# Patient Record
Sex: MALE | Race: ASIAN | Employment: FULL TIME | URBAN - METROPOLITAN AREA
[De-identification: names, ages, dates, MRNs, and addresses within clinical notes are randomized per-mention and may not be internally consistent; named-entity substitution may affect disease eponyms.]

---

## 2017-04-03 ENCOUNTER — HOSPITAL ENCOUNTER (OUTPATIENT)
Dept: SURGERY | Age: 62
Discharge: HOME | End: 2017-04-03
Payer: COMMERCIAL

## 2017-04-03 ENCOUNTER — HOSPITAL ENCOUNTER (OUTPATIENT)
Dept: PHYSICAL THERAPY | Age: 62
Discharge: HOME | End: 2017-04-03
Payer: COMMERCIAL

## 2017-04-03 VITALS
SYSTOLIC BLOOD PRESSURE: 140 MMHG | TEMPERATURE: 96.6 F | BODY MASS INDEX: 30.7 KG/M2 | OXYGEN SATURATION: 98 % | RESPIRATION RATE: 16 BRPM | HEIGHT: 69 IN | DIASTOLIC BLOOD PRESSURE: 66 MMHG | WEIGHT: 207.25 LBS | HEART RATE: 72 BPM

## 2017-04-03 LAB
ANION GAP BLD CALC-SCNC: 9 MMOL/L (ref 7–16)
APPEARANCE UR: CLEAR
APTT PPP: 27.7 SEC (ref 25.3–32.9)
ATRIAL RATE: 75 BPM
BACTERIA SPEC CULT: ABNORMAL
BASOPHILS # BLD AUTO: 0 K/UL (ref 0–0.2)
BASOPHILS # BLD: 0 % (ref 0–2)
BILIRUB UR QL: NEGATIVE
BUN SERPL-MCNC: 10 MG/DL (ref 8–23)
CALCIUM SERPL-MCNC: 8.8 MG/DL (ref 8.3–10.4)
CALCULATED P AXIS, ECG09: 38 DEGREES
CALCULATED R AXIS, ECG10: 41 DEGREES
CALCULATED T AXIS, ECG11: 46 DEGREES
CHLORIDE SERPL-SCNC: 105 MMOL/L (ref 98–107)
CO2 SERPL-SCNC: 29 MMOL/L (ref 21–32)
COLOR UR: YELLOW
CREAT SERPL-MCNC: 1.12 MG/DL (ref 0.8–1.5)
DIAGNOSIS, 93000: NORMAL
DIFFERENTIAL METHOD BLD: ABNORMAL
EOSINOPHIL # BLD: 0.4 K/UL (ref 0–0.8)
EOSINOPHIL NFR BLD: 6 % (ref 0.5–7.8)
ERYTHROCYTE [DISTWIDTH] IN BLOOD BY AUTOMATED COUNT: 13.2 % (ref 11.9–14.6)
GLUCOSE SERPL-MCNC: 88 MG/DL (ref 65–100)
GLUCOSE UR STRIP.AUTO-MCNC: NEGATIVE MG/DL
HCT VFR BLD AUTO: 41.4 % (ref 41.1–50.3)
HGB BLD-MCNC: 13.6 G/DL (ref 13.6–17.2)
HGB UR QL STRIP: NEGATIVE
IMM GRANULOCYTES # BLD: 0 K/UL (ref 0–0.5)
IMM GRANULOCYTES NFR BLD AUTO: 0.2 % (ref 0–5)
INR PPP: 1.1 (ref 0.9–1.2)
KETONES UR QL STRIP.AUTO: NEGATIVE MG/DL
LEUKOCYTE ESTERASE UR QL STRIP.AUTO: NEGATIVE
LYMPHOCYTES # BLD AUTO: 26 % (ref 13–44)
LYMPHOCYTES # BLD: 1.6 K/UL (ref 0.5–4.6)
MCH RBC QN AUTO: 30.6 PG (ref 26.1–32.9)
MCHC RBC AUTO-ENTMCNC: 32.9 G/DL (ref 31.4–35)
MCV RBC AUTO: 93 FL (ref 79.6–97.8)
MONOCYTES # BLD: 0.7 K/UL (ref 0.1–1.3)
MONOCYTES NFR BLD AUTO: 12 % (ref 4–12)
NEUTS SEG # BLD: 3.5 K/UL (ref 1.7–8.2)
NEUTS SEG NFR BLD AUTO: 56 % (ref 43–78)
NITRITE UR QL STRIP.AUTO: NEGATIVE
P-R INTERVAL, ECG05: 186 MS
PH UR STRIP: 6 [PH] (ref 5–9)
PLATELET # BLD AUTO: 225 K/UL (ref 150–450)
PMV BLD AUTO: 9.5 FL (ref 10.8–14.1)
POTASSIUM SERPL-SCNC: 4.2 MMOL/L (ref 3.5–5.1)
PROT UR STRIP-MCNC: NEGATIVE MG/DL
PROTHROMBIN TIME: 11.8 SEC (ref 9.6–12)
Q-T INTERVAL, ECG07: 382 MS
QRS DURATION, ECG06: 98 MS
QTC CALCULATION (BEZET), ECG08: 426 MS
RBC # BLD AUTO: 4.45 M/UL (ref 4.23–5.67)
SERVICE CMNT-IMP: ABNORMAL
SODIUM SERPL-SCNC: 143 MMOL/L (ref 136–145)
SP GR UR REFRACTOMETRY: 1.02 (ref 1–1.02)
UROBILINOGEN UR QL STRIP.AUTO: 0.2 EU/DL (ref 0.2–1)
VENTRICULAR RATE, ECG03: 75 BPM
WBC # BLD AUTO: 6.3 K/UL (ref 4.3–11.1)

## 2017-04-03 PROCEDURE — 97161 PT EVAL LOW COMPLEX 20 MIN: CPT

## 2017-04-03 RX ORDER — KETOCONAZOLE 20 MG/ML
SHAMPOO TOPICAL DAILY PRN
COMMUNITY
End: 2019-02-19

## 2017-04-03 RX ORDER — LEVOTHYROXINE SODIUM 88 UG/1
88 TABLET ORAL
COMMUNITY

## 2017-04-03 RX ORDER — GLUCOSAM/CHONDR-MSM6/MANGANESE 467-438 MG
CAPSULE ORAL DAILY
COMMUNITY
End: 2017-04-26

## 2017-04-03 RX ORDER — AMLODIPINE BESYLATE 5 MG/1
5 TABLET ORAL
COMMUNITY

## 2017-04-03 RX ORDER — VALSARTAN 80 MG/1
80 TABLET ORAL DAILY
COMMUNITY
End: 2019-02-19

## 2017-04-03 RX ORDER — GLYBURIDE-METFORMIN HYDROCHLORIDE 1.25; 25 MG/1; MG/1
1 TABLET ORAL 2 TIMES DAILY
COMMUNITY

## 2017-04-03 RX ORDER — GUAIFENESIN 100 MG/5ML
81 LIQUID (ML) ORAL
COMMUNITY
End: 2017-04-26

## 2017-04-03 NOTE — PERIOP NOTES
Patient verified name, , and surgery as listed in [facility]. Type 3 surgery, joint camp assessment complete. Labs per surgeon: cbc, bmp, pt, ptt, ua ; results within anesthesia guidelines; printed/placed on chart; routed to Dr. Tk Loomis, surgeon and to Dr. Heide Meléndez, PCP for further review. Labs per anesthesia protocol: none  EKG:completed per protocol; within anesthesia guidelines~ placed on chart for reference    Hibiclens and instructions given per hospital policy. Nasal Swab collected per MD order and instructions for Mupirocin nasal ointment if required. Patient provided with handouts including Guide to Surgery, Pain Management, Hand Hygiene, Blood Transfusion Education, and [place] [facility] Brochure. Patient answered medical/surgical history questions at their best of ability. All prior to admission medications documented in [facility]. Original medication prescription bottle NOT visualized during patient appointment. Patient instructed to hold all vitamins 7 days prior to surgery and NSAIDS 5 days prior to surgery. Medications to be held prior to surgery Aspirin hold for 5 days prior to surgery. Patient instructed to continue previous medications as prescribed prior to surgery and to take the following medications the day of surgery according to anesthesia guidelines with a small sip of water: Levothyroxine. Patient taught back and verbalized understanding.

## 2017-04-03 NOTE — PROGRESS NOTES
04/03/17 0900   Oxygen Therapy   O2 Sat (%) 97 %   Pulse via Oximetry 83 beats per minute   O2 Device Room air   Pre-Treatment   Breath Sounds Bilateral Clear;Diminished   Pre FEV1 (liters) 2.3 liters   % Predicted 62  (sinus allergies)   Incentive Spirometry Treatment   Actual Volume (ml) 1500 ml     Initial respiratory Assessment completed with pt. Pt was interviewed and evaluated in Joint camp prior to surgery. Patient ID:  Leandra Contreras  [id number]  38 y.o.  1955  Surgeon: Dr. Marianela Cox  Date of Surgery: 4/25/2017  Procedure: Total Right Knee Arthroplasty  Primary Care Physician: Not On File Bshsi None  Specialists:                                  Pt instructed in the use of Incentive Spirometry. Pt instructed to bring Incentive Spirometer back on date of surgery & to start using Is upon return to pt room. Pt taught proper cough technique      History of smoking:   NONE      Quit date:    Secondhand smoke:NONE      Past procedures with Oxygen desaturation:NONE    Past Medical History:   Diagnosis Date    Diabetes ([facility] [address]     thyroid    Hypertension     Hypothyroidism     takes medication                                    ENT:SINUS                                                                                                                      Respiratory history:                                  DENIES SOB                                  Respiratory meds:                                                         FAMILY PRESENT:                   NO                                        PAST SLEEP STUDY:              NO  HX OF LINA:                            NO                                     LINA assessment:                                               SLEEPS ON SIDE                                                   PHYSICAL EXAM   Body mass index is 30.61 kg/(m^2).    Visit Vitals    /66 (BP 1 Location: Right arm, BP Patient Position: Sitting)    Pulse 72    Temp 96.6 °F (35.9 °C)  Resp 16    Ht 5' 9\" (1.753 m)    Wt 94 kg (207 lb 4 oz)    SpO2 98%    BMI 30.61 kg/m2     Neck circumference: 44.5     cm    Loud snoring:MINIMAL    Witnessed apnea or wakening gasping or choking:,DENIES,     Awakens with headaches:DENIES    Morning or daytime tiredness/ sleepiness: DENIES   YET NAPS 1 HOUR 5 DAYS A WEEK    Dry mouth or sore throat in morning: DENIES    Freidman stage:4    SACS score:25    STOP/BAN                              CPAP:NA               CONT SAT HS        Referrals:    Pt. Phone Number:

## 2017-04-03 NOTE — PERIOP NOTES
Recent Results (from the past 8 hour(s))   CBC WITH AUTOMATED DIFF    Collection Time: 04/03/17  9:26 AM   Result Value Ref Range    WBC 6.3 4.3 - 11.1 K/uL    RBC 4.45 4.23 - 5.67 M/uL    HGB 13.6 13.6 - 17.2 g/dL    HCT 41.4 41.1 - 50.3 %    MCV 93.0 79.6 - 97.8 FL    MCH 30.6 26.1 - 32.9 PG    MCHC 32.9 31.4 - 35.0 g/dL    RDW 13.2 11.9 - 14.6 %    PLATELET 752 836 - 989 K/uL    MPV 9.5 (L) 10.8 - 14.1 FL    DF AUTOMATED      NEUTROPHILS 56 43 - 78 %    LYMPHOCYTES 26 13 - 44 %    MONOCYTES 12 4.0 - 12.0 %    EOSINOPHILS 6 0.5 - 7.8 %    BASOPHILS 0 0.0 - 2.0 %    IMMATURE GRANULOCYTES 0.2 0.0 - 5.0 %    ABS. NEUTROPHILS 3.5 1.7 - 8.2 K/UL    ABS. LYMPHOCYTES 1.6 0.5 - 4.6 K/UL    ABS. MONOCYTES 0.7 0.1 - 1.3 K/UL    ABS. EOSINOPHILS 0.4 0.0 - 0.8 K/UL    ABS. BASOPHILS 0.0 0.0 - 0.2 K/UL    ABS. IMM.  GRANS. 0.0 0.0 - 0.5 K/UL   METABOLIC PANEL, BASIC    Collection Time: 04/03/17  9:26 AM   Result Value Ref Range    Sodium 143 136 - 145 mmol/L    Potassium 4.2 3.5 - 5.1 mmol/L    Chloride 105 98 - 107 mmol/L    CO2 29 21 - 32 mmol/L    Anion gap 9 7 - 16 mmol/L    Glucose 88 65 - 100 mg/dL    BUN 10 8 - 23 MG/DL    Creatinine 1.12 0.8 - 1.5 MG/DL    GFR est AA >60 >60 ml/min/1.73m2    GFR est non-AA >60 >60 ml/min/1.73m2    Calcium 8.8 8.3 - 10.4 MG/DL   PROTHROMBIN TIME + INR    Collection Time: 04/03/17  9:26 AM   Result Value Ref Range    Prothrombin time 11.8 9.6 - 12.0 sec    INR 1.1 0.9 - 1.2     PTT    Collection Time: 04/03/17  9:26 AM   Result Value Ref Range    aPTT 27.7 25.3 - 32.9 SEC   URINALYSIS W/ RFLX MICROSCOPIC    Collection Time: 04/03/17  9:26 AM   Result Value Ref Range    Color YELLOW      Appearance CLEAR      Specific gravity 1.017 1.001 - 1.023      pH (UA) 6.0 5.0 - 9.0      Protein NEGATIVE  NEG mg/dL    Glucose NEGATIVE  mg/dL    Ketone NEGATIVE  NEG mg/dL    Bilirubin NEGATIVE  NEG      Blood NEGATIVE  NEG      Urobilinogen 0.2 0.2 - 1.0 EU/dL    Nitrites NEGATIVE  NEG      Leukocyte Esterase NEGATIVE  NEG     EKG, 12 LEAD, INITIAL    Collection Time: 04/03/17 10:42 AM   Result Value Ref Range    Ventricular Rate 75 BPM    Atrial Rate 75 BPM    P-R Interval 186 ms    QRS Duration 98 ms    Q-T Interval 382 ms    QTC Calculation (Bezet) 426 ms    Calculated P Axis 38 degrees    Calculated R Axis 41 degrees    Calculated T Axis 46 degrees    Diagnosis       !!! Poor data quality, interpretation may be adversely affected  Normal sinus rhythm  Normal ECG  No previous ECGs available

## 2017-04-03 NOTE — PROGRESS NOTES
Amber Boles  : (54 y.o.) [facility] at [place] [address].  [phone]       Physical Therapy Prehab Plan of Treatment and Evaluation Summary:4/3/2017    ICD-10: Treatment Diagnosis:   · Pain in Right Knee (M25.561)  · Stiffness of Right Knee, Not elsewhere classified (M25.661)  · Difficulty in walking, Not elsewhere classified (R26.2)  Precautions/Allergies:   Review of patient's allergies indicates no known allergies. MEDICAL/REFERRING DIAGNOSIS:  Unilateral primary osteoarthritis, right knee [M17.11]  REFERRING PHYSICIAN: Ramesh Freedman MD  DATE OF SURGERY: 17   Assessment:   Comments:  Pt. Plans on going to his son's house in [facility] after surgery. PROBLEM LIST (Impacting functional limitations):  Mr. Opal Ramirez presents with the following right lower extremity(s) problems:  1. Strength  2. Range of Motion  3. Home Exercise Program  4. Pain   INTERVENTIONS PLANNED:  1. Home Exercise Program  2. Educational Discussion     TREATMENT PLAN: Effective Dates: 4/3/2017 TO 4/3/2017. Frequency/Duration: Patient to continue to perform home exercise program at least twice per day up until his surgery. GOALS: (Goals have been discussed and agreed upon with patient.)  Discharge Goals: Time Frame: 1 Day  1. Patient will demonstrate independence with a home exercise program designed to increase strength, range of motion and pain control to minimize functional deficits and optimize patient for total joint replacement. Rehabilitation Potential For Stated Goals: Good  Regarding [address] therapy, I certify that the treatment plan above will be carried out by a therapist or under their direction.   Thank you for this referral,  Shraddha Olson, PT               HISTORY:   Present Symptoms:  Pain Intensity 1:  (5 at worst)  Pain Location 1: Knee   History of Present Injury/Illness (Reason for Referral):  Medical/Referring Diagnosis: Unilateral primary osteoarthritis, right knee [M17.11]   Past Medical History/Comorbidities:   Mr. Alvarado Marcelo  has a past medical history of Diabetes ([facility] [address] Hypertension; and Hypothyroidism. He also has no past medical history of Aneurysm ([facility] [address] Arrhythmia; Arthritis; Asthma; Autoimmune disease ([facility] [address] CAD (coronary artery disease); Cancer ([facility] [address] Chronic kidney disease; Chronic obstructive pulmonary disease ([address] Chronic pain; Coagulation disorder ([facility] [address] Difficult intubation; Endocarditis; GERD (gastroesophageal reflux disease); Heart failure ([facility] [address] Liver disease; Malignant hyperthermia due to anesthesia; Morbid obesity ([facility] [address] Nausea & vomiting; Pseudocholinesterase deficiency; Psychiatric disorder; PUD (peptic ulcer disease); Rheumatic fever; Seizures ([facility] [address] Sleep apnea; Stroke [facility]); or Thromboembolus ([facility] [address] Mr. Alvarado Marcelo  has no past surgical history on file.   Social History/Living Environment:   Home Environment: Private residence  # Steps to Enter: 3  Rails to Enter: No  One/Two Story Residence: Two story, live on 1st floor  # of Interior Steps: 12  Interior Rails: Left  Lift Chair Available: No  Living Alone: No  Support Systems: Child(gloria), Spouse/Significant Other/Partner  Patient Expects to be Discharged to[de-identified] Private residence  Current DME Used/Available at Home: None  Tub or Shower Type: Shower  Work/Activity:  Employed, [employer] store  Dominant Side:  RIGHT  Current Medications:  See Pre-assessment nursing note   Number of Personal Factors/Comorbidities that affect the Plan of Care: 0: LOW COMPLEXITY   EXAMINATION:   ADLs (Current Functional Status):   Ambulation:  [x] Independent  [] Walk Indoors Only  [] Walk Outdoors  [] Use Assistive Device  [] Use Wheelchair Only Dressing:  [x] Independent  Requires Assistance from Someone for:  [] Sock/Shoes  [] Pants  [] Everything   Bathing/Showering:   [x] Independent  [] Requires Assistance from Someone  [] [id number]:  [] Routine house and yard work  [x] Light Housework Only  [] None   Observation/Orthostatic Postural Assessment:       ROM/Flexibility:   Gross Assessment: Yes  AROM: Within functional limits (left LE)                       RLE Assessment  RLE Assessment (WDL): Exceptions to WDL  RLE AROM  R Knee Flexion: 120  R Knee Extension: 5   Strength:   Gross Assessment: Yes  Strength: Within functional limits (left LE)              RLE Strength  R Knee Flexion: 4  R Knee Extension: 4   Functional Mobility:    Gross Assessment: Yes    Gait Description (WDL): Exceptions to WDL  Stand to Sit: Independent  Sit to Stand: Independent  Bed to Chair: Independent  Distance (ft): 250 Feet (ft)  Ambulation - Level of Assistance: Independent  Speed/Alexus: Delayed  Stance: Right decreased  Gait Abnormalities: Antalgic          Balance:    Sitting: Intact  Standing: Intact   Body Structures Involved:  1. Bones  2. Joints  3. Muscles  4. Ligaments Body Functions Affected:  1. Movement Related Activities and Participation Affected:  1. Mobility   Number of elements that affect the Plan of Care: 1-2: LOW COMPLEXITY   CLINICAL PRESENTATION:   Presentation: Stable and uncomplicated: LOW COMPLEXITY   CLINICAL DECISION MAKING:   Outcome Measure: Tool Used: Lower Extremity Functional Scale (LEFS)  Score:  Initial: 65/80 Most Recent: X/80 (Date: -- )   Interpretation of Score: 20 questions each scored on a 5 point scale with 0 representing \"extreme difficulty or unable to perform\" and 4 representing \"no difficulty\". The lower the score, the greater the functional disability. 80/80 represents no disability. Minimal detectable change is 9 points. Score 80 79-65 64-49 48-33 32-17 16-1 0   Modifier CH CI CJ CK CL CM CN     ?  Mobility - Walking and Moving Around:     - CURRENT STATUS: CI - 1%-19% impaired, limited or restricted    - GOAL STATUS: CI - 1%-19% impaired, limited or restricted    - D/C STATUS:  CI - 1%-19% impaired, limited or restricted  Medical Necessity:   · Mr. Viktoriya Huang is expected to optimize his lower extremity strength and ROM in preparation for joint replacement surgery. Reason for Services/Other Comments:  · Achieve baseline assesment of musculoskeletal system, functional mobility and home environment. , educate in PT HEP in preparation for surgery, educate in hospital plan of care. Use of outcome tool(s) and clinical judgement create a POC that gives a: Clear prediction of patient's progress: LOW COMPLEXITY   TREATMENT:   Treatment/Session Assessment:  Patient was instructed in PT- HEP to increase strength and ROM in LEs. Answered all questions. · Post session pain:  No complaints  · Compliance with Program/Exercises: compliant most of the time.   Total Treatment Duration:  PT Patient Time In/Time Out  Time In: 1000  Time Out: 88 Axel Shepherd PT

## 2017-04-04 NOTE — PERIOP NOTES
Prescription for Mupirocin ointment 22g tube, apply intranasally to both nostrils BID x5 days pre-operatively or for a total of 10 doses; called to [facility] at [phone]. Patient notified of positive MSSA nasal swab, verbalizes understanding of usage starting on 4/20/17.    4/3/2017  6:34 PM - Blaise, Lab In [facility]   Component Results   Component Value Flag Ref Range Units Status   Special Requests: NO SPECIAL REQUESTS     Final   Culture result:  (A)    Final   MRSA target DNA not detected, SA target DNA detected.   A MRSA negative, SA positive test result does not preclude MRSA nasal colonization.

## 2017-04-06 NOTE — ADVANCED PRACTICE NURSE
Total Joint Surgery Preoperative Chart Review      Patient ID:  Nam Juarez  [id number]  79 y.o.  1955  Surgeon: Dr. Julián Dhaliwal  Date of Surgery: 4/25/2017  Procedure: Total Right Knee Arthroplasty  Primary Care Physician:  Faviola Camargo MD  :      Subjective:   Nam Juarez is a 59 y.o.  male who presents for preoperative evaluation for Total Right Knee arthroplasty. This is a preoperative chart review note based on data collected by the nurse at the surgical Pre-Assessment visit. Past Medical History:   Diagnosis Date    Diabetes ([address]     thyroid    Hypertension     Hypothyroidism     takes medication      History reviewed. No pertinent surgical history. Family History   Problem Relation Age of Onset    Stroke Mother     Heart Disease Father       Social History   Substance Use Topics    Smoking status: Never Smoker    Smokeless tobacco: Never Used    Alcohol use No       Prior to Admission medications    Medication Sig Start Date End Date Taking? Authorizing Provider   mupirocin calcium (BACTROBAN) 2 % nasal ointment by Both Nostrils route two (2) times a day. Yes Historical Provider   amLODIPine (NORVASC) 5 mg tablet Take 5 mg by mouth nightly. Indications: hypertension   Yes Historical Provider   glyBURIDE-metFORMIN (GLUCOVANCE) 1. mg per tablet Take 1 Tab by mouth two (2) times a day. Takes in morning and at 7 pm   Indications: type 2 diabetes mellitus   Yes Historical Provider   levothyroxine (SYNTHROID) 88 mcg tablet Take  by mouth Daily (before breakfast). Take / use AM day of surgery  per anesthesia protocols. Indications: hypothyroidism   Yes Historical Provider   valsartan (DIOVAN) 80 mg tablet Take 80 mg by mouth daily. Indications: hypertension   Yes Historical Provider   VIT C/E/ZN/COPPR/LUTEIN/ZEAXAN (PRESERVISION AREDS 2 PO) Take  by mouth two (2) times a day. Yes Historical Provider   glucosam-chondr msm#6-manganes 467-438-0.7 mg cap Take  by mouth daily.    Yes Historical Provider   aspirin 81 mg chewable tablet Take 81 mg by mouth nightly. Indications: myocardial infarction prevention   Yes Historical Provider   ketoconazole (NIZORAL) 2 % shampoo Apply  to affected area daily as needed for Itching. Yes Historical Provider     No Known Allergies       Objective:     Physical Exam:   No data found. ECG:    EKG Results     Procedure 720 Value Units Date/Time    EKG, 12 LEAD, INITIAL [[id number]] Collected:  04/03/17 1042    Order Status:  Completed Updated:  04/03/17 1214     Ventricular Rate 75 BPM      Atrial Rate 75 BPM      P-R Interval 186 ms      QRS Duration 98 ms      Q-T Interval 382 ms      QTC Calculation (Bezet) 426 ms      Calculated P Axis 38 degrees      Calculated R Axis 41 degrees      Calculated T Axis 46 degrees      Diagnosis --     !!! Poor data quality, interpretation may be adversely affected  Normal sinus rhythm  Normal ECG  No previous ECGs available  Confirmed by Jeni Sommer MD (), VIVIANA DURAN ([id number]) on 4/3/2017 12:14:35 PM            Data Review:   Labs:   Results for Khushi Roblero (MRN [id number]) as of 4/6/2017 14:21   Ref. Range 4/3/2017 09:26   Sodium Latest Ref Range: 136 - 145 mmol/L 143   Potassium Latest Ref Range: 3.5 - 5.1 mmol/L 4.2   Chloride Latest Ref Range: 98 - 107 mmol/L 105   CO2 Latest Ref Range: 21 - 32 mmol/L 29   Anion gap Latest Ref Range: 7 - 16 mmol/L 9   Glucose Latest Ref Range: 65 - 100 mg/dL 88   BUN Latest Ref Range: 8 - 23 MG/DL 10   Creatinine Latest Ref Range: 0.8 - 1.5 MG/DL 1.12   Calcium Latest Ref Range: 8.3 - 10.4 MG/DL 8.8   GFR est non-AA Latest Ref Range: >60 ml/min/1.73m2 >60   GFR est AA Latest Ref Range: >60 ml/min/1.73m2 >60     Results for Khushi Roblero (MRN [id number]) as of 4/6/2017 14:21   Ref.  Range 4/3/2017 09:26   WBC Latest Ref Range: 4.3 - 11.1 K/uL 6.3   RBC Latest Ref Range: 4.23 - 5.67 M/uL 4.45   HGB Latest Ref Range: 13.6 - 17.2 g/dL 13.6   HCT Latest Ref Range: 41.1 - 50.3 % 41.4   MCV Latest Ref Range: 79.6 - 97.8 FL 93.0   MCH Latest Ref Range: 26.1 - 32.9 PG 30.6   MCHC Latest Ref Range: 31.4 - 35.0 g/dL 32.9   RDW Latest Ref Range: 11.9 - 14.6 % 13.2   PLATELET Latest Ref Range: 150 - 450 K/uL 225         Total Joint Surgery Pre-Assessment Recommendations:       He is a moderate risk for sleep apnea but without significant comorbidities. Will initiate perioperative LINA precautions. Recommend continuous saturation monitoring hours of sleep, during hospitalization.               Signed By: Cristina Ross NP-C    April 6, 2017

## 2017-04-21 NOTE — H&P
H&P    Patient ID:  Adela Brewster  [id number]  35 y.o.  1955  Surgeon:  Devon Alexander MD  Date of Surgery: * No surgery date entered *  Procedure: Right Knee Total Arthroplasty  Primary Care Physician: Not On File Bshsi        Subjective:  Adela Brewster is a 59 y.o.  male who presents with Right Knee pain. He has history of Right Knee pain for several years ago. Symptoms worse with walking and relieved with rest. Conservative treatment consisting of  therapeutic injections into the knee has not helped. The patient  lives with their spouse. The patients goal after surgery is improved pain and function. Past Medical History:   Diagnosis Date    Diabetes ([address]     thyroid    Hypertension     Hypothyroidism     takes medication      No past surgical history on file. Family History   Problem Relation Age of Onset    Stroke Mother     Heart Disease Father       Social History   Substance Use Topics    Smoking status: Never Smoker    Smokeless tobacco: Never Used    Alcohol use No       Prior to Admission medications    Medication Sig Start Date End Date Taking? Authorizing Provider   mupirocin calcium (BACTROBAN) 2 % nasal ointment by Both Nostrils route two (2) times a day. Historical Provider   amLODIPine (NORVASC) 5 mg tablet Take 5 mg by mouth nightly. Indications: hypertension    Historical Provider   glyBURIDE-metFORMIN (GLUCOVANCE) 1. mg per tablet Take 1 Tab by mouth two (2) times a day. Takes in morning and at 7 pm   Indications: type 2 diabetes mellitus    Historical Provider   levothyroxine (SYNTHROID) 88 mcg tablet Take  by mouth Daily (before breakfast). Take / use AM day of surgery  per anesthesia protocols. Indications: hypothyroidism    Historical Provider   valsartan (DIOVAN) 80 mg tablet Take 80 mg by mouth daily. Indications: hypertension    Historical Provider   VIT C/E/ZN/COPPR/LUTEIN/ZEAXAN (PRESERVISION AREDS 2 PO) Take  by mouth two (2) times a day. Historical Provider   glucosam-chondr msm#6-manganes 467-438-0.7 mg cap Take  by mouth daily. Historical Provider   aspirin 81 mg chewable tablet Take 81 mg by mouth nightly. Indications: myocardial infarction prevention    Historical Provider   ketoconazole (NIZORAL) 2 % shampoo Apply  to affected area daily as needed for Itching. Historical Provider     No Known Allergies     REVIEW OF SYSTEMS:  CONSTITUTIONAL: Denies fever, decreased appetite, weight loss/gain, night sweats or fatigue. HEENT: Denies vision or hearing changes. denies glasses. denies hearing aids. CARDIAC: Denies CP, palpitations, rheumatic fever, murmur, peripheral edema, carotid artery disease or syncopal episodes. RESPIRATORY: Denies dyspnea on exertion, asthma, COPD or orthopnea. GI: Denies GERD, history of GI bleed or melena, PUD, hepatitis or cirrhosis. : Denies dysuria, hematuria. denies BPH symptoms. HEMATOLOGIC: Denies anemia or blood disorders. ENDOCRINE: Denies thyroid disease. MUSCULOSKELETAL: See HPI. NEUROLOGIC: Denies seizure, peripheral neuropathy or memory loss. PSYCH: Denies depression, anxiety or insomnia. SKIN: Denies rash or open sores. Objective:    PHYSICAL EXAM  GENERAL: No data found. EYES: PERRL. EOM intact. MOUTH:Teeth and Gums normal. NECK: Full ROM. Trachea midline. No thyromegaly or JVD. CARDIOVASCULAR: Regular rate and rhythm. No murmur or gallops. No carotid bruits. Peripheral pulses: radial 2 +, PT 2+, DP 2+ bilaterally. LUNGS: CTA bilaterally. No wheezes, rhonchi or rales. GI: positive BS. Abdomen nontender. NEUROLOGIC: Alert and oriented x 3. Bilateral equal strong had grasp and bilateral equal strong plantar flexion and dorsiflexion. GAIT: abnormal  MUSCULOSKELETAL: ROM: full range with pain. Tenderness: Medial joint line and Lateral joint line. Crepitus: present. SKIN: No rash, bruising, swelling, redness or warmth.      Labs:  No results found for this or any previous visit (from the past 24 hour(s)). Xray Right Knee: Joint space narrowing    Assessment:  Advanced Right Knee Osteoarthritis. Total Right Knee Arthroplasty Indicated. There is no problem list on file for this patient. Plan:  I have advised the patient of the risks and consequences, including possible complications of performing total joint replacement, as well as not doing this operation. The patient had the opportunity to ask questions and have them answered to their satisfaction.      Signed:  VANESSA Woods 4/21/2017

## 2017-04-24 ENCOUNTER — ANESTHESIA EVENT (OUTPATIENT)
Dept: SURGERY | Age: 62
DRG: 470 | End: 2017-04-24
Payer: COMMERCIAL

## 2017-04-25 ENCOUNTER — ANESTHESIA (OUTPATIENT)
Dept: SURGERY | Age: 62
DRG: 470 | End: 2017-04-25
Payer: COMMERCIAL

## 2017-04-25 ENCOUNTER — HOSPITAL ENCOUNTER (INPATIENT)
Age: 62
LOS: 1 days | Discharge: HOME HEALTH | DRG: 470 | End: 2017-04-26
Attending: ORTHOPAEDIC SURGERY | Admitting: ORTHOPAEDIC SURGERY
Payer: COMMERCIAL

## 2017-04-25 PROBLEM — M19.90 OSTEOARTHRITIS: Status: ACTIVE | Noted: 2017-04-25

## 2017-04-25 LAB
ABO + RH BLD: NORMAL
BLOOD GROUP ANTIBODIES SERPL: NORMAL
GLUCOSE BLD STRIP.AUTO-MCNC: 108 MG/DL (ref 65–100)
GLUCOSE BLD STRIP.AUTO-MCNC: 152 MG/DL (ref 65–100)
GLUCOSE BLD STRIP.AUTO-MCNC: 196 MG/DL (ref 65–100)
GLUCOSE BLD STRIP.AUTO-MCNC: 197 MG/DL (ref 65–100)
HGB BLD-MCNC: 12.3 G/DL (ref 13.6–17.2)
SPECIMEN EXP DATE BLD: NORMAL

## 2017-04-25 PROCEDURE — 77030018836 HC SOL IRR NACL ICUM -A: Performed by: ORTHOPAEDIC SURGERY

## 2017-04-25 PROCEDURE — 76060000034 HC ANESTHESIA 1.5 TO 2 HR: Performed by: ORTHOPAEDIC SURGERY

## 2017-04-25 PROCEDURE — 77030020782 HC GWN BAIR PAWS FLX 3M -B

## 2017-04-25 PROCEDURE — 77030033067 HC SUT PDO STRATFX SPIR J&J -B: Performed by: ORTHOPAEDIC SURGERY

## 2017-04-25 PROCEDURE — 77030006777 HC BLD SAW OSC CNMD -B: Performed by: ORTHOPAEDIC SURGERY

## 2017-04-25 PROCEDURE — 77030013819 HC MX SYS CEM ZIMM -B: Performed by: ORTHOPAEDIC SURGERY

## 2017-04-25 PROCEDURE — C1776 JOINT DEVICE (IMPLANTABLE): HCPCS | Performed by: ORTHOPAEDIC SURGERY

## 2017-04-25 PROCEDURE — 77030018673: Performed by: ORTHOPAEDIC SURGERY

## 2017-04-25 PROCEDURE — 74011000250 HC RX REV CODE- 250: Performed by: ANESTHESIOLOGY

## 2017-04-25 PROCEDURE — 74011250636 HC RX REV CODE- 250/636: Performed by: ANESTHESIOLOGY

## 2017-04-25 PROCEDURE — 86580 TB INTRADERMAL TEST: CPT | Performed by: ORTHOPAEDIC SURGERY

## 2017-04-25 PROCEDURE — 77030011208: Performed by: ORTHOPAEDIC SURGERY

## 2017-04-25 PROCEDURE — 82962 GLUCOSE BLOOD TEST: CPT

## 2017-04-25 PROCEDURE — 74011000302 HC RX REV CODE- 302: Performed by: ORTHOPAEDIC SURGERY

## 2017-04-25 PROCEDURE — 94762 N-INVAS EAR/PLS OXIMTRY CONT: CPT

## 2017-04-25 PROCEDURE — 0SRC0J9 REPLACEMENT OF RIGHT KNEE JOINT WITH SYNTHETIC SUBSTITUTE, CEMENTED, OPEN APPROACH: ICD-10-PCS | Performed by: ORTHOPAEDIC SURGERY

## 2017-04-25 PROCEDURE — 77030006804 HC BLD SAW RECIP CNMD -B: Performed by: ORTHOPAEDIC SURGERY

## 2017-04-25 PROCEDURE — 74011250636 HC RX REV CODE- 250/636: Performed by: ORTHOPAEDIC SURGERY

## 2017-04-25 PROCEDURE — 77030032490 HC SLV COMPR SCD KNE COVD -B

## 2017-04-25 PROCEDURE — 97165 OT EVAL LOW COMPLEX 30 MIN: CPT

## 2017-04-25 PROCEDURE — 85018 HEMOGLOBIN: CPT | Performed by: ORTHOPAEDIC SURGERY

## 2017-04-25 PROCEDURE — 65270000029 HC RM PRIVATE

## 2017-04-25 PROCEDURE — 74011250637 HC RX REV CODE- 250/637: Performed by: ORTHOPAEDIC SURGERY

## 2017-04-25 PROCEDURE — 76010000162 HC OR TIME 1.5 TO 2 HR INTENSV-TIER 1: Performed by: ORTHOPAEDIC SURGERY

## 2017-04-25 PROCEDURE — 77030008467 HC STPLR SKN COVD -B: Performed by: ORTHOPAEDIC SURGERY

## 2017-04-25 PROCEDURE — 76942 ECHO GUIDE FOR BIOPSY: CPT | Performed by: ORTHOPAEDIC SURGERY

## 2017-04-25 PROCEDURE — 86900 BLOOD TYPING SEROLOGIC ABO: CPT | Performed by: ORTHOPAEDIC SURGERY

## 2017-04-25 PROCEDURE — 77030013727 HC IRR FAN PULSVC ZIMM -B: Performed by: ORTHOPAEDIC SURGERY

## 2017-04-25 PROCEDURE — 74011000258 HC RX REV CODE- 258: Performed by: ORTHOPAEDIC SURGERY

## 2017-04-25 PROCEDURE — 77030011640 HC PAD GRND REM COVD -A: Performed by: ORTHOPAEDIC SURGERY

## 2017-04-25 PROCEDURE — 74011000250 HC RX REV CODE- 250

## 2017-04-25 PROCEDURE — 74011250636 HC RX REV CODE- 250/636

## 2017-04-25 PROCEDURE — 77010033678 HC OXYGEN DAILY

## 2017-04-25 PROCEDURE — 77030003665 HC NDL SPN BBMI -A

## 2017-04-25 PROCEDURE — 77030003602 HC NDL NRV BLK BBMI -B

## 2017-04-25 PROCEDURE — 74011000250 HC RX REV CODE- 250: Performed by: ORTHOPAEDIC SURGERY

## 2017-04-25 PROCEDURE — 97161 PT EVAL LOW COMPLEX 20 MIN: CPT

## 2017-04-25 PROCEDURE — 76010010054 HC POST OP PAIN BLOCK: Performed by: ORTHOPAEDIC SURGERY

## 2017-04-25 PROCEDURE — 74011250637 HC RX REV CODE- 250/637: Performed by: ANESTHESIOLOGY

## 2017-04-25 PROCEDURE — 77030019557 HC ELECTRD VES SEAL MEDT -F: Performed by: ORTHOPAEDIC SURGERY

## 2017-04-25 PROCEDURE — 77030031139 HC SUT VCRL2 J&J -A: Performed by: ORTHOPAEDIC SURGERY

## 2017-04-25 PROCEDURE — 77030007880 HC KT SPN EPDRL BBMI -B

## 2017-04-25 PROCEDURE — 77030012890

## 2017-04-25 PROCEDURE — 76210000016 HC OR PH I REC 1 TO 1.5 HR: Performed by: ORTHOPAEDIC SURGERY

## 2017-04-25 PROCEDURE — 77030006720 HC BLD PAT RMR ZIMM -B: Performed by: ORTHOPAEDIC SURGERY

## 2017-04-25 PROCEDURE — 77030034849: Performed by: ORTHOPAEDIC SURGERY

## 2017-04-25 PROCEDURE — C1713 ANCHOR/SCREW BN/BN,TIS/BN: HCPCS | Performed by: ORTHOPAEDIC SURGERY

## 2017-04-25 PROCEDURE — 77030012935 HC DRSG AQUACEL BMS -B: Performed by: ORTHOPAEDIC SURGERY

## 2017-04-25 PROCEDURE — 94760 N-INVAS EAR/PLS OXIMETRY 1: CPT

## 2017-04-25 PROCEDURE — 36415 COLL VENOUS BLD VENIPUNCTURE: CPT | Performed by: ORTHOPAEDIC SURGERY

## 2017-04-25 PROCEDURE — 77030036688 HC BLNKT CLD THER S2SG -B

## 2017-04-25 DEVICE — BASEPLATE TIB SZ 7 KNEE CEM FIX BEAR ATTUNE: Type: IMPLANTABLE DEVICE | Site: KNEE | Status: FUNCTIONAL

## 2017-04-25 DEVICE — (D)CEMENT BNE HV R 40GM -- DUPE USE ITEM 353850: Type: IMPLANTABLE DEVICE | Site: KNEE | Status: FUNCTIONAL

## 2017-04-25 DEVICE — COMPONENT FEM SZ 7 R KNEE POST STBL CEM ATTUNE: Type: IMPLANTABLE DEVICE | Site: KNEE | Status: FUNCTIONAL

## 2017-04-25 DEVICE — COMPONENT PAT DIA38MM POLYETH DOME CEM MEDIALIZED ATTUNE: Type: IMPLANTABLE DEVICE | Site: KNEE | Status: FUNCTIONAL

## 2017-04-25 DEVICE — INSERT TIB SZ 7 THK7MM KNEE POST STBL FIX BEAR ATTUNE: Type: IMPLANTABLE DEVICE | Site: KNEE | Status: FUNCTIONAL

## 2017-04-25 RX ORDER — PROPOFOL 10 MG/ML
INJECTION, EMULSION INTRAVENOUS
Status: DISCONTINUED | OUTPATIENT
Start: 2017-04-25 | End: 2017-04-25 | Stop reason: HOSPADM

## 2017-04-25 RX ORDER — ROPIVACAINE HYDROCHLORIDE 2 MG/ML
INJECTION, SOLUTION EPIDURAL; INFILTRATION; PERINEURAL AS NEEDED
Status: DISCONTINUED | OUTPATIENT
Start: 2017-04-25 | End: 2017-04-25 | Stop reason: HOSPADM

## 2017-04-25 RX ORDER — DEXAMETHASONE SODIUM PHOSPHATE 4 MG/ML
INJECTION, SOLUTION INTRA-ARTICULAR; INTRALESIONAL; INTRAMUSCULAR; INTRAVENOUS; SOFT TISSUE AS NEEDED
Status: DISCONTINUED | OUTPATIENT
Start: 2017-04-25 | End: 2017-04-25 | Stop reason: HOSPADM

## 2017-04-25 RX ORDER — BUPIVACAINE HYDROCHLORIDE 7.5 MG/ML
INJECTION, SOLUTION INTRASPINAL AS NEEDED
Status: DISCONTINUED | OUTPATIENT
Start: 2017-04-25 | End: 2017-04-25 | Stop reason: HOSPADM

## 2017-04-25 RX ORDER — NALOXONE HYDROCHLORIDE 0.4 MG/ML
.2-.4 INJECTION, SOLUTION INTRAMUSCULAR; INTRAVENOUS; SUBCUTANEOUS
Status: DISCONTINUED | OUTPATIENT
Start: 2017-04-25 | End: 2017-04-26 | Stop reason: HOSPADM

## 2017-04-25 RX ORDER — ZOLPIDEM TARTRATE 5 MG/1
5 TABLET ORAL
Status: DISCONTINUED | OUTPATIENT
Start: 2017-04-25 | End: 2017-04-26 | Stop reason: HOSPADM

## 2017-04-25 RX ORDER — CEFAZOLIN SODIUM IN 0.9 % NACL 2 G/50 ML
2 INTRAVENOUS SOLUTION, PIGGYBACK (ML) INTRAVENOUS EVERY 8 HOURS
Status: COMPLETED | OUTPATIENT
Start: 2017-04-25 | End: 2017-04-26

## 2017-04-25 RX ORDER — OXYCODONE HYDROCHLORIDE 5 MG/1
10 TABLET ORAL
Status: DISCONTINUED | OUTPATIENT
Start: 2017-04-25 | End: 2017-04-25 | Stop reason: HOSPADM

## 2017-04-25 RX ORDER — SODIUM CHLORIDE 0.9 % (FLUSH) 0.9 %
5-10 SYRINGE (ML) INJECTION AS NEEDED
Status: DISCONTINUED | OUTPATIENT
Start: 2017-04-25 | End: 2017-04-26 | Stop reason: HOSPADM

## 2017-04-25 RX ORDER — ASPIRIN 81 MG/1
81 TABLET ORAL EVERY 12 HOURS
Status: DISCONTINUED | OUTPATIENT
Start: 2017-04-25 | End: 2017-04-26 | Stop reason: HOSPADM

## 2017-04-25 RX ORDER — FENTANYL CITRATE 50 UG/ML
100 INJECTION, SOLUTION INTRAMUSCULAR; INTRAVENOUS ONCE
Status: COMPLETED | OUTPATIENT
Start: 2017-04-25 | End: 2017-04-25

## 2017-04-25 RX ORDER — LIDOCAINE HYDROCHLORIDE 10 MG/ML
0.3 INJECTION INFILTRATION; PERINEURAL ONCE
Status: COMPLETED | OUTPATIENT
Start: 2017-04-25 | End: 2017-04-25

## 2017-04-25 RX ORDER — AMLODIPINE BESYLATE 5 MG/1
5 TABLET ORAL
Status: DISCONTINUED | OUTPATIENT
Start: 2017-04-25 | End: 2017-04-26 | Stop reason: HOSPADM

## 2017-04-25 RX ORDER — SODIUM CHLORIDE, SODIUM LACTATE, POTASSIUM CHLORIDE, CALCIUM CHLORIDE 600; 310; 30; 20 MG/100ML; MG/100ML; MG/100ML; MG/100ML
100 INJECTION, SOLUTION INTRAVENOUS CONTINUOUS
Status: DISCONTINUED | OUTPATIENT
Start: 2017-04-25 | End: 2017-04-25 | Stop reason: HOSPADM

## 2017-04-25 RX ORDER — ONDANSETRON 8 MG/1
8 TABLET, ORALLY DISINTEGRATING ORAL
Status: DISCONTINUED | OUTPATIENT
Start: 2017-04-25 | End: 2017-04-26 | Stop reason: HOSPADM

## 2017-04-25 RX ORDER — MIDAZOLAM HYDROCHLORIDE 1 MG/ML
2 INJECTION, SOLUTION INTRAMUSCULAR; INTRAVENOUS
Status: COMPLETED | OUTPATIENT
Start: 2017-04-25 | End: 2017-04-25

## 2017-04-25 RX ORDER — PROMETHAZINE HYDROCHLORIDE 25 MG/ML
25 INJECTION, SOLUTION INTRAMUSCULAR; INTRAVENOUS
Status: DISCONTINUED | OUTPATIENT
Start: 2017-04-25 | End: 2017-04-26 | Stop reason: HOSPADM

## 2017-04-25 RX ORDER — LEVOTHYROXINE SODIUM 88 UG/1
88 TABLET ORAL
Status: DISCONTINUED | OUTPATIENT
Start: 2017-04-26 | End: 2017-04-26 | Stop reason: HOSPADM

## 2017-04-25 RX ORDER — HYDROMORPHONE HYDROCHLORIDE 1 MG/ML
1 INJECTION, SOLUTION INTRAMUSCULAR; INTRAVENOUS; SUBCUTANEOUS
Status: DISCONTINUED | OUTPATIENT
Start: 2017-04-25 | End: 2017-04-26 | Stop reason: HOSPADM

## 2017-04-25 RX ORDER — INSULIN LISPRO 100 [IU]/ML
INJECTION, SOLUTION INTRAVENOUS; SUBCUTANEOUS
Status: DISCONTINUED | OUTPATIENT
Start: 2017-04-25 | End: 2017-04-26 | Stop reason: HOSPADM

## 2017-04-25 RX ORDER — AMOXICILLIN 250 MG
2 CAPSULE ORAL DAILY
Status: DISCONTINUED | OUTPATIENT
Start: 2017-04-26 | End: 2017-04-26 | Stop reason: HOSPADM

## 2017-04-25 RX ORDER — HYDROMORPHONE HYDROCHLORIDE 2 MG/ML
0.5 INJECTION, SOLUTION INTRAMUSCULAR; INTRAVENOUS; SUBCUTANEOUS
Status: DISCONTINUED | OUTPATIENT
Start: 2017-04-25 | End: 2017-04-25 | Stop reason: HOSPADM

## 2017-04-25 RX ORDER — KETOROLAC TROMETHAMINE 30 MG/ML
INJECTION, SOLUTION INTRAMUSCULAR; INTRAVENOUS AS NEEDED
Status: DISCONTINUED | OUTPATIENT
Start: 2017-04-25 | End: 2017-04-25 | Stop reason: HOSPADM

## 2017-04-25 RX ORDER — ONDANSETRON 2 MG/ML
4 INJECTION INTRAMUSCULAR; INTRAVENOUS
Status: DISCONTINUED | OUTPATIENT
Start: 2017-04-25 | End: 2017-04-25 | Stop reason: HOSPADM

## 2017-04-25 RX ORDER — OXYCODONE HYDROCHLORIDE 5 MG/1
5 TABLET ORAL
Status: DISCONTINUED | OUTPATIENT
Start: 2017-04-25 | End: 2017-04-26 | Stop reason: HOSPADM

## 2017-04-25 RX ORDER — CELECOXIB 200 MG/1
200 CAPSULE ORAL EVERY 12 HOURS
Status: DISCONTINUED | OUTPATIENT
Start: 2017-04-25 | End: 2017-04-26 | Stop reason: HOSPADM

## 2017-04-25 RX ORDER — LIDOCAINE HYDROCHLORIDE 20 MG/ML
INJECTION, SOLUTION EPIDURAL; INFILTRATION; INTRACAUDAL; PERINEURAL AS NEEDED
Status: DISCONTINUED | OUTPATIENT
Start: 2017-04-25 | End: 2017-04-25 | Stop reason: HOSPADM

## 2017-04-25 RX ORDER — CELECOXIB 200 MG/1
200 CAPSULE ORAL ONCE
Status: COMPLETED | OUTPATIENT
Start: 2017-04-25 | End: 2017-04-25

## 2017-04-25 RX ORDER — DIPHENHYDRAMINE HCL 25 MG
25 CAPSULE ORAL
Status: DISCONTINUED | OUTPATIENT
Start: 2017-04-25 | End: 2017-04-26 | Stop reason: HOSPADM

## 2017-04-25 RX ORDER — MIDAZOLAM HYDROCHLORIDE 1 MG/ML
INJECTION, SOLUTION INTRAMUSCULAR; INTRAVENOUS AS NEEDED
Status: DISCONTINUED | OUTPATIENT
Start: 2017-04-25 | End: 2017-04-25 | Stop reason: HOSPADM

## 2017-04-25 RX ORDER — OXYCODONE HYDROCHLORIDE 5 MG/1
10 TABLET ORAL
Status: DISCONTINUED | OUTPATIENT
Start: 2017-04-25 | End: 2017-04-26 | Stop reason: HOSPADM

## 2017-04-25 RX ORDER — DEXAMETHASONE SODIUM PHOSPHATE 100 MG/10ML
10 INJECTION INTRAMUSCULAR; INTRAVENOUS ONCE
Status: DISCONTINUED | OUTPATIENT
Start: 2017-04-26 | End: 2017-04-26 | Stop reason: HOSPADM

## 2017-04-25 RX ORDER — ACETAMINOPHEN 500 MG
1000 TABLET ORAL EVERY 6 HOURS
Status: DISCONTINUED | OUTPATIENT
Start: 2017-04-26 | End: 2017-04-26 | Stop reason: HOSPADM

## 2017-04-25 RX ORDER — MORPHINE SULFATE 10 MG/ML
INJECTION, SOLUTION INTRAMUSCULAR; INTRAVENOUS AS NEEDED
Status: DISCONTINUED | OUTPATIENT
Start: 2017-04-25 | End: 2017-04-25 | Stop reason: HOSPADM

## 2017-04-25 RX ORDER — SODIUM CHLORIDE 9 MG/ML
100 INJECTION, SOLUTION INTRAVENOUS CONTINUOUS
Status: DISCONTINUED | OUTPATIENT
Start: 2017-04-25 | End: 2017-04-26 | Stop reason: HOSPADM

## 2017-04-25 RX ORDER — VALSARTAN 80 MG/1
80 TABLET ORAL DAILY
Status: DISCONTINUED | OUTPATIENT
Start: 2017-04-26 | End: 2017-04-26 | Stop reason: HOSPADM

## 2017-04-25 RX ORDER — ACETAMINOPHEN 500 MG
1000 TABLET ORAL ONCE
Status: DISCONTINUED | OUTPATIENT
Start: 2017-04-25 | End: 2017-04-25 | Stop reason: HOSPADM

## 2017-04-25 RX ORDER — SODIUM CHLORIDE 0.9 % (FLUSH) 0.9 %
5-10 SYRINGE (ML) INJECTION AS NEEDED
Status: DISCONTINUED | OUTPATIENT
Start: 2017-04-25 | End: 2017-04-25 | Stop reason: HOSPADM

## 2017-04-25 RX ORDER — ACETAMINOPHEN 10 MG/ML
1000 INJECTION, SOLUTION INTRAVENOUS ONCE
Status: COMPLETED | OUTPATIENT
Start: 2017-04-25 | End: 2017-04-25

## 2017-04-25 RX ORDER — KETOCONAZOLE 20 MG/ML
SHAMPOO TOPICAL
Status: DISCONTINUED | OUTPATIENT
Start: 2017-04-25 | End: 2017-04-26 | Stop reason: HOSPADM

## 2017-04-25 RX ORDER — ONDANSETRON 2 MG/ML
INJECTION INTRAMUSCULAR; INTRAVENOUS AS NEEDED
Status: DISCONTINUED | OUTPATIENT
Start: 2017-04-25 | End: 2017-04-25 | Stop reason: HOSPADM

## 2017-04-25 RX ORDER — SODIUM CHLORIDE 0.9 % (FLUSH) 0.9 %
5-10 SYRINGE (ML) INJECTION EVERY 8 HOURS
Status: DISCONTINUED | OUTPATIENT
Start: 2017-04-25 | End: 2017-04-25 | Stop reason: HOSPADM

## 2017-04-25 RX ORDER — SODIUM CHLORIDE 0.9 % (FLUSH) 0.9 %
5-10 SYRINGE (ML) INJECTION EVERY 8 HOURS
Status: DISCONTINUED | OUTPATIENT
Start: 2017-04-25 | End: 2017-04-26 | Stop reason: HOSPADM

## 2017-04-25 RX ORDER — CEFAZOLIN SODIUM IN 0.9 % NACL 2 G/50 ML
2 INTRAVENOUS SOLUTION, PIGGYBACK (ML) INTRAVENOUS ONCE
Status: COMPLETED | OUTPATIENT
Start: 2017-04-25 | End: 2017-04-25

## 2017-04-25 RX ADMIN — CEFAZOLIN 2 G: 1 INJECTION, POWDER, FOR SOLUTION INTRAMUSCULAR; INTRAVENOUS; PARENTERAL at 17:29

## 2017-04-25 RX ADMIN — SODIUM CHLORIDE, SODIUM LACTATE, POTASSIUM CHLORIDE, AND CALCIUM CHLORIDE: 600; 310; 30; 20 INJECTION, SOLUTION INTRAVENOUS at 08:35

## 2017-04-25 RX ADMIN — AMLODIPINE BESYLATE 5 MG: 5 TABLET ORAL at 22:25

## 2017-04-25 RX ADMIN — LIDOCAINE HYDROCHLORIDE 0.3 ML: 10 INJECTION, SOLUTION INFILTRATION; PERINEURAL at 07:16

## 2017-04-25 RX ADMIN — TUBERCULIN PURIFIED PROTEIN DERIVATIVE 5 UNITS: 5 INJECTION, SOLUTION INTRADERMAL at 07:16

## 2017-04-25 RX ADMIN — FENTANYL CITRATE 100 MCG: 50 INJECTION, SOLUTION INTRAMUSCULAR; INTRAVENOUS at 08:32

## 2017-04-25 RX ADMIN — METFORMIN HYDROCHLORIDE: 500 TABLET, FILM COATED ORAL at 17:30

## 2017-04-25 RX ADMIN — ASPIRIN 81 MG: 81 TABLET, COATED ORAL at 22:25

## 2017-04-25 RX ADMIN — MIDAZOLAM HYDROCHLORIDE 0.5 MG: 1 INJECTION, SOLUTION INTRAMUSCULAR; INTRAVENOUS at 08:53

## 2017-04-25 RX ADMIN — LIDOCAINE HYDROCHLORIDE 40 MG: 20 INJECTION, SOLUTION EPIDURAL; INFILTRATION; INTRACAUDAL; PERINEURAL at 08:59

## 2017-04-25 RX ADMIN — PROPOFOL 25 MCG/KG/MIN: 10 INJECTION, EMULSION INTRAVENOUS at 08:59

## 2017-04-25 RX ADMIN — SODIUM CHLORIDE, SODIUM LACTATE, POTASSIUM CHLORIDE, AND CALCIUM CHLORIDE: 600; 310; 30; 20 INJECTION, SOLUTION INTRAVENOUS at 09:27

## 2017-04-25 RX ADMIN — ONDANSETRON 8 MG: 8 TABLET, ORALLY DISINTEGRATING ORAL at 21:11

## 2017-04-25 RX ADMIN — DEXAMETHASONE SODIUM PHOSPHATE 10 MG: 4 INJECTION, SOLUTION INTRA-ARTICULAR; INTRALESIONAL; INTRAMUSCULAR; INTRAVENOUS; SOFT TISSUE at 09:00

## 2017-04-25 RX ADMIN — MIDAZOLAM HYDROCHLORIDE 2 MG: 1 INJECTION, SOLUTION INTRAMUSCULAR; INTRAVENOUS at 08:32

## 2017-04-25 RX ADMIN — ROPIVACAINE HYDROCHLORIDE 20 ML: 2 INJECTION, SOLUTION EPIDURAL; INFILTRATION; PERINEURAL at 08:35

## 2017-04-25 RX ADMIN — ONDANSETRON 4 MG: 2 INJECTION INTRAMUSCULAR; INTRAVENOUS at 08:53

## 2017-04-25 RX ADMIN — CEFAZOLIN 2 G: 1 INJECTION, POWDER, FOR SOLUTION INTRAMUSCULAR; INTRAVENOUS; PARENTERAL at 08:46

## 2017-04-25 RX ADMIN — CELECOXIB 200 MG: 200 CAPSULE ORAL at 22:25

## 2017-04-25 RX ADMIN — SODIUM CHLORIDE 100 ML/HR: 900 INJECTION, SOLUTION INTRAVENOUS at 12:00

## 2017-04-25 RX ADMIN — MIDAZOLAM HYDROCHLORIDE 0.5 MG: 1 INJECTION, SOLUTION INTRAMUSCULAR; INTRAVENOUS at 09:32

## 2017-04-25 RX ADMIN — CELECOXIB 200 MG: 200 CAPSULE ORAL at 07:15

## 2017-04-25 RX ADMIN — MIDAZOLAM HYDROCHLORIDE 0.5 MG: 1 INJECTION, SOLUTION INTRAMUSCULAR; INTRAVENOUS at 09:03

## 2017-04-25 RX ADMIN — ACETAMINOPHEN 1000 MG: 500 TABLET, FILM COATED ORAL at 23:45

## 2017-04-25 RX ADMIN — BUPIVACAINE HYDROCHLORIDE 2 ML: 7.5 INJECTION, SOLUTION INTRASPINAL at 08:57

## 2017-04-25 RX ADMIN — SODIUM CHLORIDE, SODIUM LACTATE, POTASSIUM CHLORIDE, AND CALCIUM CHLORIDE 100 ML/HR: 600; 310; 30; 20 INJECTION, SOLUTION INTRAVENOUS at 07:15

## 2017-04-25 RX ADMIN — MIDAZOLAM HYDROCHLORIDE 0.5 MG: 1 INJECTION, SOLUTION INTRAMUSCULAR; INTRAVENOUS at 08:49

## 2017-04-25 RX ADMIN — ACETAMINOPHEN 1000 MG: 10 INJECTION, SOLUTION INTRAVENOUS at 17:30

## 2017-04-25 RX ADMIN — SODIUM CHLORIDE 100 ML/HR: 900 INJECTION, SOLUTION INTRAVENOUS at 22:21

## 2017-04-25 NOTE — PERIOP NOTES
TRANSFER - OUT REPORT:    Verbal report given to Fabiana Waddell RN (name) on Jasmyn Mora  being transferred to room 317 (unit) for routine post - op       Report consisted of patients Situation, Background, Assessment and   Recommendations(SBAR). Information from the following report(s) SBAR, Kardex, Procedure Summary and Intake/Output was reviewed with the receiving nurse. Lines:   Peripheral IV 04/25/17 Left Hand (Active)   Site Assessment Clean, dry, & intact 4/25/2017 10:35 AM   Phlebitis Assessment 0 4/25/2017 10:35 AM   Infiltration Assessment 0 4/25/2017 10:35 AM   Dressing Status Clean, dry, & intact 4/25/2017 10:35 AM   Dressing Type Tape;Transparent 4/25/2017 10:35 AM   Hub Color/Line Status Infusing;Patent 4/25/2017 10:35 AM        Opportunity for questions and clarification was provided.       Patient transported with:   O2 @ 2 liters

## 2017-04-25 NOTE — PROGRESS NOTES
TRANSFER - IN REPORT:    Verbal report received from [facility]) on Adela Brewster  being received from PACU(unit) for routine progression of care      Report consisted of patients Situation, Background, Assessment and   Recommendations(SBAR). Information from the following report(s) SBAR, Procedure Summary, Intake/Output, MAR and Recent Results was reviewed with the receiving nurse. Opportunity for questions and clarification was provided. Assessment completed upon patients arrival to unit and care assumed.

## 2017-04-25 NOTE — ANESTHESIA PROCEDURE NOTES
Spinal Block    Start time: 4/25/2017 8:49 AM  End time: 4/25/2017 8:57 AM  Performed by: Cailin Abdalla  Authorized by: Lori Santos     Pre-procedure:   Indications: primary anesthetic  Preanesthetic Checklist: patient identified, risks and benefits discussed, anesthesia consent, site marked, patient being monitored and timeout performed    Timeout Time: 08:49          Spinal Block:   Patient Position:  Seated  Prep Region:  Lumbar      Location:  L3-4  Technique:  Single shot  Local:  Lidocaine 1%  Local Dose (mL):  5    Needle:   Needle Type:  Quincke  Needle Gauge:  25 G  Attempts:  2      Events: CSF confirmed, no blood with aspiration and no paresthesia        Assessment:  Insertion:  Uncomplicated  Patient tolerance:  Patient tolerated the procedure well with no immediate complications

## 2017-04-25 NOTE — PERIOP NOTES
Betadine lavage: 17.5cc of betadine lot # [id number] , exp. Date [id number]  ,  in 1cc of . 9NS Lot # [id number]  , exp.  Date :  [id number]

## 2017-04-25 NOTE — OP NOTES
[address] [place] [address]  Cemented Total Knee Arthroplasty  Patient:Serafin Roberts   : 1955  Medical Record [id number]  Pre-operative Diagnosis:  Unilateral primary osteoarthritis, right knee [M17.11]  Post-operative Diagnosis: Unilateral primary osteoarthritis, right knee    Surgeon: Elfego Goldman MD  Assistant: Jenna Jones PA-C    Anesthesia: Spinal    Procedure: Total Knee Arthroplasty with use of Bone Cement  The complexity of the total joint surgery requires the use of a first assistant for positioning, retraction and assistance in closure. The patient's Body mass index is 30.61 kg/(m^2). , BMI's greater then 40 make surgical exposure and retraction extremely difficult and increase operative time. Tourniquet Time: none  EBL: 150cc  Additional Findings: Severe DJD  Releases none    Luciana Huertas was brought to the operating room and positioned on the operating table. He was anethestized  A hagan catheter was placed preoperatively and IV antibiotics was administered. Prior to the incision being made a timeout was called identifying the patient, procedure ,operative side and surgeon. . The right leg was prepped and draped in the usual sterile manner  An anterior longitudinal incision was accomplished just medial to the tibial tubercle and extending approximal 6 centimeters proximal to the superior pole of the patella. A medial parapatellar capsular incision was performed. The medial capsular flap was elevated around to the insertion of the semimembranous tendon. The patella was everted and the knee flexed and externally rotated. The medial and external menisci were excised. The lateral half of the fat pad excised and the patella femoral ligament was released. The anterior cruciate ligament was resected and the posterior cruciate ligament was substituted. Using extramedullary instrumentation, the tibial cut was accomplished with appropriate posterior slope.   Approxiamately 2 mm of bone was removed from the low side of the tibia. The distal femur was next addressed. A drill hole was made above the intracondylar notch. Using appropriate intramedullary instrumentation,a 6 degree valgus distal cut was accomplished. A femur was sized. The anterior and posterior cuts were then made about the distal femur. The osteophytes were removed from the tibial and femoral surfaces. The flexion and extension gaps were assessed with the appropriate spacer blocks. Additional surgical procedures included none. The flexion and extension gaps were deemed appropriately balanced. The appropriate cutting blocks were then utilized to perform the anterior chamfer, posterior chamfer and notch cuts, with appropriate lateral tranlation accomplished for the patellofemoral groove. The tibia was sized. The tibial base plate was pinned into place with the appropriate external rotation and stem site prepared. A preliminary range of motion was accomplished with the above size trial components. A polyethylene insert allowed the patient to obtain full extension as well as appropriate flexion. The patient's ligaments were stable in flexion and extension to medial and lateral stressing and the alignment was through the appropriate mechanical axis. The patella was then everted. The bone was resected appropriately for a pegged patella button. A trial reduction revealed appropriate tracking through the patellofemoral groove. All trial components were removed and the cut surfaces prepared for cementing with irrigation and debridement of the bone interstices. There were no femoral deficiencies. There were no tibial deficiencies. No augmentation was utilized. The implants were cemented into position and pressurized in the usual fashion. Bone and cement debris were meticulously removed. The betadine lavage protocol was used.     Jarad Soda knee was placed through range of motion and noted to be stable as mentioned above with the trail components. The wound was dry, therefore no drain was used. The operative knee was injected with 60cc of Naropin, 10 cc's of morphine and 1 cc of 30mg of Toradol. The capsular layer was closed using a #1 vicryl suture, while subcutaneous layers were closed using 2-0 Vicryl interrupted sutures. Finally the skin was closed using 3-0 Vicryl and skin staples, which were applied in occlusive fashion and sterile bandage applied. An Iceman cryo pad was applied on the operative leg. Sponge count and needle counts were correct. Sarwat Weston left the operating room     Implants:   Implant Name Type Inv.  Item Serial No.  Lot No. LRB No. Used   CEMENT BNE HV R 40GM -- PALACOS - [id number]  CEMENT BNE HV R 40GM -- PALACOS [id number] [employer] [id number] Right 2   PAT EKATERINA DOME MEDIAL 38MM -- ATTUNE - [id number]  PAT EKATERINA DOME MEDIAL 38MM -- ATTUNE [id number] [facility] [id number] Right 1   FEM PS SZ 7 RT EKATERINA -- ATTUNE - [id number]  FEM PS SZ 7 RT EKATERINA -- ATTUNE [id number] [facility] [id number] Right 1   BASE TIB FB SZ 7 EKATERINA -- ATTUNE - [id number]  BASE TIB FB SZ 7 EKATERINA -- ATTUNE [id number] [facility] [id number] Right 1   INSERT TIB FB PS SZ 7 7MM -- ATTUNE - [id number]   INSERT TIB FB PS SZ 7 7MM -- ATTUNE [id number] [facility] [id number] Right 1     Signed By: Desiree Castillo MD

## 2017-04-25 NOTE — PROGRESS NOTES
Problem: Self Care Deficits Care Plan (Adult)  Goal: *Acute Goals and Plan of Care (Insert Text)  GOALS:   DISCHARGE GOALS (in preparation for going home/rehab): 3 days  1. Mr. Matthew Blackman will perform one lower body dressing activity with minimal assistance required to demonstrate improved functional mobility and safety. 2. Mr. Matthew Blackman will perform one lower body bathing activity with minimal assistance required to demonstrate improved functional mobility and safety. 3. Mr. Matthew Blackman will perform toileting/toilet transfer with contact guard assistance to demonstrate improved functional mobility and safety. 4. Mr. Matthew Blackman will perform shower transfer with contact guard assistance to demonstrate improved functional mobility and safety. JOINT CAMP OCCUPATIONAL THERAPY TKA: Initial Assessment 4/25/2017  INPATIENT: Hospital Day: 1  Payor: SSN Funding / Plan[place] BLUE [place] BLUE ESSENTIALS JAMIE / Product Type: [employer] /      NAME/AGE/GENDER: Vijaya Caldwell is a 59 y.o. male           PRIMARY DIAGNOSIS:  Unilateral primary osteoarthritis, right knee [M17.11]              Procedure(s) and Anesthesia Type:     * RIGHT KNEE ARTHROPLASTY TOTAL - Spinal (Right)  ICD-10: Treatment Diagnosis:        · Pain in Right Knee (M25.561)  · Stiffness of Right Knee, Not elsewhere classified (A37.699)       ASSESSMENT:      Mr. Matthew Blackman is s/p right TKA and presents with decreased weight bearing on right LE and decreased independence with functional mobility and activities of daily living. Patient would benefit from skilled Occupational Therapy to maximize independence and safety with self-care task and functional mobility. Pt would also benefit from education on adaptive equipment and safety precautions in preparation for going home or for recommendations for post-hospital rehab program. Patient plans for further rehab at home with home health services and good family support, wife and children.   OT reviewed therapy schedule and plan of care with patient. Patient was able to transfer and preform self care skills as charted below. Patient instructed to call for assistance when needing to get up from the bed and all needs in reach. Patient verbalized understanding of call light. This section established at most recent assessment   PROBLEM LIST (Impairments causing functional limitations):  1. Decreased Strength  2. Decreased ADL/Functional Activities  3. Decreased Transfer Abilities  4. Increased Pain  5. Increased Fatigue  6. Decreased Flexibility/Joint Mobility  7. Decreased Knowledge of Precautions    INTERVENTIONS PLANNED: (Benefits and precautions of occupational therapy have been discussed with the patient.)  1. Activities of daily living training  2. Adaptive equipment training  3. Balance training  4. Clothing management  5. Donning&doffing training  6. Theraputic activity      TREATMENT PLAN: Frequency/Duration: Follow patient 1-2 times to address above goals. Rehabilitation Potential For Stated Goals: GOOD      RECOMMENDED REHABILITATION/EQUIPMENT: (at time of discharge pending progress): Continue Skilled Therapy and Home Health: Physical Therapy. OCCUPATIONAL PROFILE AND HISTORY:   History of Present Injury/Illness (Reason for Referral): Pt presents this date s/p (right) TKA. Past Medical History/Comorbidities:   Mr. Alvarado Marcelo  has a past medical history of Diabetes ([address] Hypertension; Hypothyroidism; and Osteoarthritis (4/25/2017). He also has no past medical history of Aneurysm ([address] Arrhythmia; Asthma; Autoimmune disease ([address] CAD (coronary artery disease); Cancer ([address] Chronic kidney disease; Chronic obstructive pulmonary disease ([address] Chronic pain; Coagulation disorder ([address] Difficult intubation; Endocarditis; GERD (gastroesophageal reflux disease); Heart failure ([address] Liver disease; Malignant hyperthermia due to anesthesia; Morbid obesity ([address]  Nausea & vomiting; Pseudocholinesterase deficiency; Psychiatric disorder; PUD (peptic ulcer disease); Rheumatic fever; Seizures ([facility] Sleep apnea; Stroke [facility]); or Thromboembolus ([facility] Mr. Jay Goodwin  has no past surgical history on file. Social History/Living Environment:   Home Environment: Private residence  # Steps to Enter: 3  Rails to Enter: No  One/Two Story Residence: Two story, live on 1st floor  # of Interior Steps: 12  Interior Rails: Left  Living Alone: No  Support Systems: Child(gloria), Spouse/Significant Other/Partner  Patient Expects to be Discharged to[de-identified] Private residence  Current DME Used/Available at Home: None  Tub or Shower Type: Shower  Prior Level of Function/Work/Activity:  Independent       Number of Personal Factors/Comorbidities that affect the Plan of Care: Brief history (0):  LOW COMPLEXITY   ASSESSMENT OF OCCUPATIONAL PERFORMANCE[de-identified]   Most Recent Physical Functioning:   Balance  Sitting: Intact  Standing: Pull to stand; With support        Patient Vitals for the past 6 hrs:       BP BP Patient Position SpO2 O2 Flow Rate (L/min) Pulse   04/25/17 1120 106/59 At rest 99 % 2 l/min 74   04/25/17 1135 109/59 At rest 100 % 2 l/min 67   04/25/17 1207 116/73 - 100 % - 66   04/25/17 1350 - - 99 % 2 l/min -   04/25/17 1425 - - 96 % - -   04/25/17 1551 98/57 At rest 95 % - 85        Gross Assessment  AROM: Generally decreased, functional (s/p R TKA)  Strength: Generally decreased, functional (s/p R TKA)  Coordination: Generally decreased, functional              Coordination  Fine Motor Skills-Upper: Left Intact; Right Intact  Gross Motor Skills-Upper: Left Intact; Right Intact           Mental Status  Neurologic State: Alert  Orientation Level: Oriented X4  Cognition: Appropriate decision making  Perception: Appears intact  Perseveration: No perseveration noted  Safety/Judgement: Awareness of environment                    Basic ADLs (From Assessment) Complex ADLs (From Assessment)   Basic ADL  Feeding: Independent  Oral Facial Hygiene/Grooming: Supervision  Bathing: Moderate assistance  Upper Body Dressing: Supervision  Lower Body Dressing: Moderate assistance  Toileting: Moderate assistance     Grooming/Bathing/Dressing Activities of Daily Living     Cognitive Retraining  Safety/Judgement: Awareness of environment                 Functional Transfers  Toilet Transfer : Moderate assistance  Shower Transfer: Moderate assistance     Bed/Mat Mobility  Supine to Sit: Minimum assistance  Sit to Supine: Minimum assistance  Sit to Stand: Minimum assistance;Assist x2  Scooting: Stand-by asssistance           Physical Skills Involved:  1. Range of Motion  2. Balance  3. Mobility Cognitive Skills Affected (resulting in the inability to perform in a timely and safe manner): 1. none Psychosocial Skills Affected:  1. language barrier    Number of elements that affect the Plan of Care: 3-5:  MODERATE COMPLEXITY   CLINICAL DECISION MAKIN[address] [place] [address] AM-PAC 6 Clicks   Basic Mobility Inpatient Short Form  How much help from another person does the patient currently need. .. Total A Lot A Little None   1. Putting on and taking off regular lower body clothing?   [ ] 1   [X] 2   [ ] 3   [ ] 4   2. Bathing (including washing, rinsing, drying)? [ ] 1   [X] 2   [ ] 3   [ ] 4   3. Toileting, which includes using toilet, bedpan or urinal?   [ ] 1   [ ] 2   [X] 3   [ ] 4   4. Putting on and taking off regular upper body clothing?   [ ] 1   [ ] 2   [ ] 3   [X] 4   5. Taking care of personal grooming such as brushing teeth? [ ] 1   [ ] 2   [ ] 3   [X] 4   6. Eating meals? [ ] 1   [ ] 2   [ ] 3   [X] 4   © , Trustees of [address] [place] [address], under license to [employer] [employer]. All rights reserved   Score:  Initial: 19 Most Recent: X (Date: -- )     Interpretation of Tool:  Represents activities that are increasingly more difficult (i.e. Bed mobility, Transfers, Gait).        Score 24 23 22-20 19-15 14-10 9-7 6       Modifier CH CI CJ CK CL CM CN         · Self Care:               - CURRENT STATUS:    CK - 40%-59% impaired, limited or restricted               - GOAL STATUS:           CJ - 20%-39% impaired, limited or restricted               - D/C STATUS:                       ---------------To be determined---------------  Payor: BLUE CROSS / [place] BLUE ESSENTIALS [place]AMIE / Product Type: JAMIE /       Medical Necessity:     · Patient is expected to demonstrate progress in range of motion, balance and functional technique to increase independence with self care. Reason for Services/Other Comments:  · Patient would benefit from skilled Occupational Therapy to maximize independence and safety with self-care task and functional mobility. .   Use of outcome tool(s) and clinical judgement create a POC that gives a: LOW COMPLEXITY                 TREATMENT:   (In addition to Assessment/Re-Assessment sessions the following treatments were rendered)      Pre-treatment Symptoms/Complaints:  No complaint of pain   Pain: Initial:   Pain Intensity 1: 0 0 Post Session:  0      Assessment/Reassessment only, no treatment provided today     Treatment/Session Assessment:         Response to Treatment:  Pt with nausea and vomiting upon sitting edge of bed. Education:  [ ] Home Exercises  [X] Fall Precautions  [ ] Hip Precautions [ ] Going Home Video  [X] Knee/Hip Prosthesis Review  [X] Walker Management/Safety [X] Adaptive Equipment as Needed         Interdisciplinary Collaboration:   · Physical Therapist  · Occupational Therapist  · Registered Nurse     After treatment position/precautions:   · Supine in bed  · Bed/Chair-wheels locked  · Call light within reach  · RN notified  · Family at bedside     Compliance with Program/Exercises: Will assess as treatment progresses. Recommendations/Intent for next treatment session:  Treatment next visit will focus on increasing Mr. Negrete independence with bed mobility, transfers, self care training and patient education.        Total Treatment Duration:  OT Patient Time In/Time Out  Time In: 1435  Time Out: Franklyn Multani, OT

## 2017-04-25 NOTE — INTERVAL H&P NOTE
H&P Update:  Vijaya Caldwell was seen and examined. History and physical has been reviewed. The patient has been examined.  There have been no significant clinical changes since the completion of the originally dated History and Physical.    Signed By: VANESSA Murray     April 25, 2017 6:59 AM

## 2017-04-25 NOTE — ANESTHESIA PROCEDURE NOTES
Right adductor canal block    Start time: 4/25/2017 8:32 AM  End time: 4/25/2017 8:35 AM  Performed by: Ramiro Herring  Authorized by: Ramiro Herring       Pre-procedure: Indications: at surgeon's request and post-op pain management    Preanesthetic Checklist: patient identified, risks and benefits discussed, site marked, timeout performed, anesthesia consent given and patient being monitored    Timeout Time: 08:32          Block Type:   Block Type:   Adductor canal  Laterality:  Right  Monitoring:  Continuous pulse ox, frequent vital sign checks, heart rate, oxygen and responsive to questions  Injection Technique:  Single shot  Procedures: ultrasound guided    Patient Position: supine  Prep: chlorhexidine    Location:  Mid thigh  Needle Gauge:  20 G  Needle Localization:  Ultrasound guidance  Medication Injected:  0.2%  ropivacaine  Volume (mL):  20    Assessment:  Number of attempts:  1  Injection Assessment:  Incremental injection every 5 mL, local visualized surrounding nerve on ultrasound, negative aspiration for blood, no intravascular symptoms, no paresthesia and ultrasound image on chart  Patient tolerance:  Patient tolerated the procedure well with no immediate complications

## 2017-04-25 NOTE — ANESTHESIA POSTPROCEDURE EVALUATION
Post-Anesthesia Evaluation and Assessment    Patient: Lolita Maurer MRN: [id number]  SSN: [id number]    YOB: 1955  Age: 59 y.o. Sex: male       Cardiovascular Function/Vital Signs  Visit Vitals    /73    Pulse 66    Temp 35.9 °C (96.6 °F)    Resp 16    Ht 5' 9\" (1.753 m)    Wt 94 kg (207 lb 4 oz)    SpO2 100%    BMI 30.61 kg/m2       Patient is status post spinal anesthesia for Procedure(s):  RIGHT KNEE ARTHROPLASTY TOTAL. Nausea/Vomiting: None    Postoperative hydration reviewed and adequate. Pain:  Pain Scale 1: Numeric (0 - 10) (04/25/17 1207)  Pain Intensity 1: 0 (04/25/17 1207)   Managed    Neurological Status:   Neuro (WDL): Within Defined Limits (04/25/17 1120)  Neuro  Neurologic State: (P) Alert; Appropriate for age (04/25/17 1217)  Cognition: (P) Appropriate decision making; Appropriate for age attention/concentration; Appropriate safety awareness (04/25/17 1217)  LUE Motor Response: (P) Purposeful (04/25/17 1217)  LLE Motor Response: (P) Purposeful (04/25/17 1217)  RUE Motor Response: (P) Purposeful (04/25/17 1217)  RLE Motor Response: (P) Purposeful (04/25/17 1217)   Block resolving    Mental Status and Level of Consciousness: Alert and oriented     Pulmonary Status:   O2 Device: Nasal cannula (04/25/17 1135)   Adequate oxygenation and airway patent    Complications related to anesthesia: None    Post-anesthesia assessment completed.  No concerns    Signed By: Dayan Betancourt MD     April 25, 2017

## 2017-04-25 NOTE — CONSULTS
HOSPITALIST Consult  NAME:  Jania Sherman   Age:  59 y.o.  :   1955   MRN:   [id number]  PCP: Not On File [facility]  Treatment Team: Attending Provider: Vincenzo Savage MD; Consulting Provider: Lisa Walsh MD; Consulting Provider: Anila Mariee MD  Full Code   HPI:   Mr. Francois Durham is a 58 yo male who underwent a right TKA. Hospitalist consulted for medical management. Pt has hx of DM, HTN. Denies hx of DVT, PE. Denies CP, SOB. Results summary of Diagnostic Studies/Procedures copied from within [facility] EMR:       Complete ROS done and is as stated in HPI or otherwise negative  Past Medical History:   Diagnosis Date    Diabetes ([facility] [address]     thyroid    Hypertension     Hypothyroidism     takes medication    Osteoarthritis 2017      History reviewed. No pertinent surgical history. Prior to Admission Medications   Prescriptions Last Dose Informant Patient Reported? Taking? VIT C/E/ZN/COPPR/LUTEIN/ZEAXAN (PRESERVISION AREDS 2 PO) 2017 at Unknown time  Yes Yes   Sig: Take  by mouth two (2) times a day. amLODIPine (NORVASC) 5 mg tablet 2017 at Unknown time  Yes Yes   Sig: Take 5 mg by mouth nightly. Indications: hypertension   aspirin 81 mg chewable tablet 2017  Yes No   Sig: Take 81 mg by mouth nightly. Indications: myocardial infarction prevention   glucosam-chondr msm#6-manganes 467-438-0.7 mg cap Unknown at Unknown time  Yes No   Sig: Take  by mouth daily. glyBURIDE-metFORMIN (GLUCOVANCE) 1. mg per tablet 2017 at Unknown time  Yes Yes   Sig: Take 1 Tab by mouth two (2) times a day. Takes in morning and at 7 pm   Indications: type 2 diabetes mellitus   ketoconazole (NIZORAL) 2 % shampoo 2017 at Unknown time  Yes Yes   Sig: Apply  to affected area daily as needed for Itching. levothyroxine (SYNTHROID) 88 mcg tablet 2017 at 0500  Yes Yes   Sig: Take  by mouth Daily (before breakfast). Take / use AM day of surgery  per anesthesia protocols. Indications: hypothyroidism   mupirocin calcium (BACTROBAN) 2 % nasal ointment Not Taking at Unknown time  Yes No   Sig: by Both Nostrils route two (2) times a day. valsartan (DIOVAN) 80 mg tablet 2017 at Unknown time  Yes Yes   Sig: Take 80 mg by mouth daily. Indications: hypertension      Facility-Administered Medications: None     No Known Allergies   Social History   Substance Use Topics    Smoking status: Never Smoker    Smokeless tobacco: Never Used    Alcohol use No      Family History   Problem Relation Age of Onset    Stroke Mother     Heart Disease Father           Objective:     Visit Vitals    /73    Pulse 66    Temp 96.6 °F (35.9 °C)    Resp 16    Ht 5' 9\" (1.753 m)    Wt 94 kg (207 lb 4 oz)    SpO2 100%    BMI 30.61 kg/m2      Temp (24hrs), Av.4 °F (36.3 °C), Min:96.6 °F (35.9 °C), Max:98.7 °F (37.1 °C)    Oxygen Therapy  O2 Sat (%): 100 % (17 1207)  O2 Device: Nasal cannula (17 1135)  O2 Flow Rate (L/min): 2 l/min (17 1135)  Physical Exam:  General:    Alert, cooperative, no distress, appears stated age. Head:   Normocephalic, without obvious abnormality, atraumatic. Nose:  Nares normal. No drainage or sinus tenderness. Lungs:   CTA, resp even and nonlabored  Heart:  S1S2 present without murmurs rubs gallops. RRR. No edema  Abdomen:   Soft, non-tender. Not distended. Bowel sounds normal. No masses  Extremities: No cyanosis. Limited ROM RLE  Skin:     Texture, turgor normal. No rashes or lesions. Not Jaundiced  Neurologic: Alert and oriented X3, no focal deficits    Data Review:   Recent Results (from the past 24 hour(s))   TYPE & SCREEN    Collection Time: 17  7:05 AM   Result Value Ref Range    Crossmatch Expiration 2017     ABO/Rh(D) A POSITIVE     Antibody screen NEG    GLUCOSE, POC    Collection Time: 17  7:30 AM   Result Value Ref Range    Glucose (POC) 108 (H) 65 - 100 mg/dL       Assessment and Plan:      LINDSAY/Carlos Campbell 1106 Problems    Diagnosis Date Noted    Osteoarthritis 04/25/2017     HTN:  Chronic, controlled, continue current regimen    DM II: continue home regimen, add SSI    Notes, labs, VS, diagnostic testing reviewed  Case discussed with pt, care team, Dr. Quirino Nye  Time spent with pt 30 min  Thank you for this consult. We will sign off. Call with questions.        Delmer Raza NP

## 2017-04-25 NOTE — PERIOP NOTES
TRANSFER - OUT REPORT:    Verbal report given to John Abreu RN on Lolita Maurer  being transferred to PACU for routine progression of care       Report consisted of patients Situation, Background, Assessment and   Recommendations(SBAR). Information from the following report(s) Kardex, MAR and Recent Results was reviewed with the receiving nurse. Lines:   Peripheral IV 04/25/17 Left Hand (Active)   Site Assessment Clean, dry, & intact 4/25/2017  7:00 AM   Phlebitis Assessment 0 4/25/2017  7:00 AM   Infiltration Assessment 0 4/25/2017  7:00 AM   Dressing Status Clean, dry, & intact 4/25/2017  7:00 AM   Dressing Type Tape;Transparent 4/25/2017  7:00 AM   Hub Color/Line Status Green; Infusing;Patent 4/25/2017  7:00 AM        Opportunity for questions and clarification was provided.       Patient transported with:   [employer]

## 2017-04-25 NOTE — ANESTHESIA PREPROCEDURE EVALUATION
Anesthetic History   No history of anesthetic complications            Review of Systems / Medical History  Patient summary reviewed and pertinent labs reviewed    Pulmonary                Comments: Snorer, no sleep study   Neuro/Psych   Within defined limits           Cardiovascular    Hypertension              Exercise tolerance: >4 METS     GI/Hepatic/Renal  Within defined limits              Endo/Other    Diabetes: well controlled, type 2  Hypothyroidism: well controlled       Other Findings              Physical Exam    Airway  Mallampati: II  TM Distance: 4 - 6 cm  Neck ROM: normal range of motion   Mouth opening: Normal     Cardiovascular    Rhythm: regular  Rate: normal         Dental    Dentition: Caps/crowns     Pulmonary  Breath sounds clear to auscultation               Abdominal         Other Findings            Anesthetic Plan    ASA: 2  Anesthesia type: spinal            Anesthetic plan and risks discussed with: Patient

## 2017-04-25 NOTE — PROGRESS NOTES
04/25/17 1941   Oxygen Therapy   O2 Sat (%) 95 %   Pulse via Oximetry 74 beats per minute   O2 Device Room air   O2 Flow Rate (L/min) 0 l/min    Continuous sat monitor ordered QHS. Pt set up on Monitor #9. Previous data deleted. Pt on room air. Alarms set per protocol. NAD. Family at bedside.

## 2017-04-25 NOTE — IP AVS SNAPSHOT
Thuan Calvillo 
 
 
 [address] 
[phone] Patient: Amber Boles MRN: [id number] TFP:25/14/9130 You are allergic to the following No active allergies Immunizations Administered for This Admission Name Date  
 TB Skin Test (PPD) Intradermal 4/25/2017 Recent Documentation Height Weight BMI Smoking Status 1.753 m 94 kg 30.61 kg/m2 Never Smoker Emergency Contacts Name Discharge Info Relation Home Work Mobile [place] DISCHARGE CAREGIVER [3] Son [22] [phone] About your hospitalization You were admitted on:  April 25, 2017 [facility]  [facility] You were discharged on:  April 26, 2017 Unit phone number:  [phone] Why you were hospitalized Your primary diagnosis was:  S/P Total Knee Arthroplasty Your diagnoses also included:  Osteoarthritis Providers Seen During Your Hospitalizations Provider Role Specialty Primary office phone Jan Sherwood MD Attending Provider Orthopedic Surgery [phone] Your Primary Care Physician (PCP) Primary Care Physician Office Phone Office Fax NOT ON FILE ** None ** ** None ** Follow-up Information Follow up With Details Comments Contact Info Not On File Bshsi   Not On File (62) Patient has a PCP but that physician is not listed in [facility]. Jan Sherwood MD  As scheduled by office [address] [address] 
[phone] [address]  Will contact you within 48 hrs [address] [place] [address] 
[phone] Current Discharge Medication List  
  
START taking these medications Dose & Instructions Dispensing Information Comments Morning Noon Evening Bedtime  
 aspirin delayed-release 81 mg tablet Replaces:  aspirin 81 mg chewable tablet Your next dose is: Today Dose:  81 mg Take 1 Tab by mouth every twelve (12) hours every twelve (12) hours for 30 days. Quantity:  60 Tab Refills:  0  
     
   
   
  
   
  
 oxyCODONE IR 5 mg immediate release tablet Commonly known as:  oLri [place] Your next dose is: Today Dose:  5-10 mg Take 1-2 Tabs by mouth every three (3) hours as needed. Max Daily Amount: 80 mg.  
 Quantity:  60 Tab Refills:  0 CONTINUE these medications which have NOT CHANGED Dose & Instructions Dispensing Information Comments Morning Noon Evening Bedtime  
 amLODIPine 5 mg tablet Commonly known as:  [place] Your next dose is: Today Dose:  5 mg Take 5 mg by mouth nightly. Indications: hypertension Refills:  0  
     
   
   
   
  
  
 glyBURIDE-metFORMIN 1. mg per tablet Commonly known as:  Marine Vladislav Your next dose is: Today Dose:  1 Tab Take 1 Tab by mouth two (2) times a day. Takes in morning and at 7 pm   Indications: type 2 diabetes mellitus Refills:  0  
     
   
   
  
   
  
 ketoconazole 2 % shampoo Commonly known as:  NIZORAL Your next dose is: Today Apply  to affected area daily as needed for Itching. Refills:  0  
     
   
   
  
   
  
 levothyroxine 88 mcg tablet Commonly known as:  SYNTHROID Your next dose is: Today Take  by mouth Daily (before breakfast). Take / use AM day of surgery  per anesthesia protocols. Indications: hypothyroidism Refills:  0  
     
  
   
   
   
  
 valsartan 80 mg tablet Commonly known as:  DIOVAN Your next dose is:  Tomorrow Dose:  80 mg Take 80 mg by mouth daily. Indications: hypertension Refills:  0 STOP taking these medications   
 aspirin 81 mg chewable tablet Replaced by:  aspirin delayed-release 81 mg tablet glucosam-chondr msm#6-manganes [id number] mg Cap  
   
  
 mupirocin calcium 2 % nasal ointment Commonly known as:  BACTROBAN  
   
  
 PRESERVISION AREDS 2 PO Where to Get Your Medications Information on where to get these meds will be given to you by the nurse or doctor. ! Ask your nurse or doctor about these medications  
  aspirin delayed-release 81 mg tablet  
 oxyCODONE IR 5 mg immediate release tablet Discharge Instructions [facility] Patient Discharge Instructions Nadia Husain / [id number] : 1955 Admitted 2017 Discharged: 2017 IF YOU HAVE ANY PROBLEMS ONCE YOU ARE AT HOME CALL THE FOLLOWING NUMBERS:  
Main office number: [phone] Take Home Medications · It is important that you take the medication exactly as they are prescribed. · Keep your medication in the bottles provided by the pharmacist and keep a list of the medication names, dosages, and times to be taken in your wallet. · Do not take other medications without consulting your doctor. What to do at [facility] Resume your prehospital diet. If you have excessive nausea or vomitting call your doctor's office Home Physical Therapy is arranged. Use rolling walker when walking. Use Oli Hose stockings until we see you in the office for your follow up appointment with Dr. Nisreen Gastelum. Patients who have had a joint replacement should not drive until you are seen for your follow up appointment by Dr. Nisreen Gastelum. When to Call - Call if you have a temperature greater then 101 
- Unable to keep food down - Loose control of your bladder or bowel function - Are unable to bear any weight  
- Need a pain medication refill DISCHARGE SUMMARY from Nurse The following personal items collected during your admission are returned to you:  
Dental Appliance: Dental Appliances: None Vision: Visual Aid: None Hearing Aid:   na 
 Jewelry: Jewelry: None Clothing: Clothing: At bedside Other Valuables: Other Valuables: Cell Phone (with son) Valuables sent to safe:   na 
 
PATIENT INSTRUCTIONS: 
 
After general anesthesia or intravenous sedation, for 24 hours or while taking prescription Narcotics: · Limit your activities · Do not drive and operate hazardous machinery · Do not make important personal or business decisions · Do  not drink alcoholic beverages · If you have not urinated within 8 hours after discharge, please contact your surgeon on call. Report the following to your surgeon: 
· Excessive pain, swelling, redness or odor of or around the surgical area · Temperature over 101 · Nausea and vomiting lasting longer than 4 hours or if unable to take medications · Any signs of decreased circulation or nerve impairment to extremity: change in color, persistent  numbness, tingling, coldness or increase pain · Any questions, call office @ [phone] Keep scheduled follow up appointment. If need to change, call office @ [phone]. *  Please give a list of your current medications to your Primary Care Provider. *  Please update this list whenever your medications are discontinued, doses are 
    changed, or new medications (including over-the-counter products) are added. *  Please carry medication information at all times in case of emergency situations. Total Knee Replacement: What to Expect at Home Your Recovery When you leave the hospital, you should be able to move around with a walker or crutches. But you will need someone to help you at home for the next few weeks or until you have more energy and can move around better. If there is no one to help you at home, you may go to a rehabilitation center. You will go home with a bandage and stitches or staples. Change the bandage as your doctor tells you to.  Your doctor will remove your stitches or staples 10 to 21 days after your surgery. You may still have some mild pain, and the area may be swollen for 3 to 6 months after surgery. Your knee will continue to improve for 6 to 12 months. You will probably use a walker for 1 to 3 weeks and then use crutches. When you are ready, you can use a cane. You will probably be able to walk on your own in 4 to 8 weeks. You will need to do months of physical rehabilitation (rehab) after a knee replacement. Rehab will help you strengthen the muscles of the knee and help you regain movement. After you recover, your artificial knee will allow you to do normal daily activities with less pain or no pain at all. You may be able to hike, dance, ride a bike, and play golf. Talk to your doctor about whether you can do more strenuous activities. Always tell your caregivers that you have an artificial knee. How long it will take to walk on your own, return to normal activities, and go back to work depends on your health and how well your rehabilitation (rehab) program goes. The better you do with your rehab exercises, the quicker you will get your strength and movement back. This care sheet gives you a general idea about how long it will take for you to recover. But each person recovers at a different pace. Follow the steps below to get better as quickly as possible. How can you care for yourself at home? Activity · Rest when you feel tired. You may take a nap, but do not stay in bed all day. When you sit, use a chair with arms. You can use the arms to help you stand up. · Work with your physical therapist to find the best way to exercise. You may be able to take frequent, short walks using crutches or a walker. What you can do as your knee heals will depend on whether your new knee is cemented or uncemented. You may not be able to do certain things for a while if your new knee is uncemented.  
· After your knee has healed enough, you can do more strenuous activities with caution. ¨ You can golf, but use a golf cart, and do not wear shoes with spikes. ¨ You can bike on a flat road or on a stationary bike. Avoid biking up hills. ¨ Your doctor may suggest that you stay away from activities that put stress on your knee. These include tennis or badminton, squash or racquetball, contact sports like football, jumping (such as in basketball), jogging, or running. ¨ Avoid activities where you might fall. These include horseback riding, skiing, and mountain biking. · Do not sit for more than 1 hour at a time. Get up and walk around for a while before you sit again. If you must sit for a long time, prop up your leg with a chair or footstool. This will help you avoid swelling. · Ask your doctor when you can shower. You may need to take sponge baths until your stitches or staples have been removed. · Ask your doctor when you can drive again. It may take up to 8 weeks after knee replacement surgery before it is safe for you to drive. · When you get into a car, sit on the edge of the seat. Then pull in your legs, and turn to face the front. · You should be able to do many everyday activities 3 to 6 weeks after your surgery. You will probably need to take 4 to 16 weeks off from work. When you can go back to work depends on the type of work you do and how you feel. · Ask your doctor when it is okay for you to have sex. · Do not lift anything heavier than 10 pounds and do not lift weights for 12 weeks. Diet · By the time you leave the hospital, you should be eating your normal diet. If your stomach is upset, try bland, low-fat foods like plain rice, broiled chicken, toast, and yogurt. Your doctor may suggest that you take iron and vitamin supplements. · Drink plenty of fluids (unless your doctor tells you not to). · Eat healthy foods, and watch your portion sizes. Try to stay at your ideal weight. Too much weight puts more stress on your new knee. · You may notice that your bowel movements are not regular right after your surgery. This is common. Try to avoid constipation and straining with bowel movements. You may want to take a fiber supplement every day. If you have not had a bowel movement after a couple of days, ask your doctor about taking a mild laxative. Medicines · Your doctor will tell you if and when you can restart your medicines. He or she will also give you instructions about taking any new medicines. · If you take blood thinners, such as warfarin (Coumadin), clopidogrel (Plavix), or aspirin, be sure to talk to your doctor. He or she will tell you if and when to start taking those medicines again. Make sure that you understand exactly what your doctor wants you to do. · Your doctor may give you a blood-thinning medicine to prevent blood clots. If you take a blood thinner, be sure you get instructions about how to take your medicine safely. Blood thinners can cause serious bleeding problems. This medicine could be in pill form or as a shot (injection). If a shot is necessary, your doctor will tell you how to do this. · Be safe with medicines. Take pain medicines exactly as directed. ¨ If the doctor gave you a prescription medicine for pain, take it as prescribed. ¨ If you are not taking a prescription pain medicine, ask your doctor if you can take an over-the-counter medicine. ¨ Plan to take your pain medicine 30 minutes before exercises. It is easier to prevent pain before it starts than to stop it once it has started. · If you think your pain medicine is making you sick to your stomach: 
¨ Take your medicine after meals (unless your doctor has told you not to). ¨ Ask your doctor for a different pain medicine. · If your doctor prescribed antibiotics, take them as directed. Do not stop taking them just because you feel better. You need to take the full course of antibiotics. Incision care · You will have a bandage over the cut (incision) and staples or stitches. Take the bandage off when your doctor says it is okay. · Your doctor will remove the staples or stitches 10 days to 3 weeks after the surgery and replace them with strips of tape. Leave the tape on for a week or until it falls off. Exercise · Your rehab program will give you a number of exercises to do to help you get back your knee's range of motion and strength. Always do them as your therapist tells you. Ice and elevation · For pain and swelling, put ice or a cold pack on the area for 10 to 20 minutes at a time. Put a thin cloth between the ice and your skin. Other instructions · Continue to wear your support stockings as your doctor says. These help to prevent blood clots. The length of time that you will have to wear them depends on your activity level and the amount of swelling. · Wear medical alert jewelry that says you may need antibiotics before any procedure, including dental work. You can buy this at most [employer]. · You have metal pieces in your knee. These may set off some airport metal detectors. Carry a medical alert card that says you have an artificial joint, just in case. Follow-up care is a key part of your treatment and safety. Be sure to make and go to all appointments, and call your doctor if you are having problems. It's also a good idea to know your test results and keep a list of the medicines you take. When should you call for help? Call 911 anytime you think you may need emergency care. For example, call if: 
· You passed out (lost consciousness). · You have severe trouble breathing. · You have sudden chest pain and shortness of breath, or you cough up blood. Call your doctor now or seek immediate medical care if: 
· You have signs of infection, such as: 
¨ Increased pain, swelling, warmth, or redness. ¨ Red streaks leading from the incision. ¨ Pus draining from the incision. ¨ A fever. · You have signs of a blood clot, such as: 
¨ Pain in your calf, back of the knee, thigh, or groin. ¨ Redness and swelling in your leg or groin. · Your incision comes open and begins to bleed, or the bleeding increases. · You have pain that does not get better after you take pain medicine. Watch closely for changes in your health, and be sure to contact your doctor if: 
· You do not have a bowel movement after taking a laxative. Where can you learn more? Go to http://genie-brooke.info/. Enter F478 in the search box to learn more about \"Total Knee Replacement: What to Expect at Home. \" Current as of: August 4, 2016 Content Version: 11.2 © 1165-8283 [employer]. Care instructions adapted under license by [employer] (which disclaims liability or warranty for this information). If you have questions about a medical condition or this instruction, always ask your healthcare professional. [place] [address] any warranty or liability for your use of this information. These are general instructions for a healthy lifestyle: No smoking/ No tobacco products/ Avoid exposure to second hand smoke Surgeon General's Warning:  Quitting smoking now greatly reduces serious risk to your health. Obesity, smoking, and sedentary lifestyle greatly increases your risk for illness A healthy diet, regular physical exercise & weight monitoring are important for maintaining a healthy lifestyle You may be retaining fluid if you have a history of heart failure or if you experience any of the following symptoms:  Weight gain of 3 pounds or more overnight or 5 pounds in a week, increased swelling in our hands or feet or shortness of breath while lying flat in bed. Please call your doctor as soon as you notice any of these symptoms; do not wait until your next office visit. Recognize signs and symptoms of STROKE: 
 
F-face looks uneven A-arms unable to move or move even S-speech slurred or non-existent T-time-call 911 as soon as signs and symptoms begin-DO NOT go Back to bed or wait to see if you get better-TIME IS BRAIN. The discharge information has been reviewed with the patient. The patient verbalized understanding. Information obtained by : 
I understand that if any problems occur once I am at home I am to contact my physician. I understand and acknowledge receipt of the instructions indicated above. Physician's or R.N.'s Signature                                                                  Date/Time Patient or Representative Signature                                                          Date/Time Discharge Orders None [facility] Announcement We are excited to announce that we are making your provider's discharge notes available to you in [facility]. You will see these notes when they are completed and signed by the physician that discharged you from your recent hospital stay. If you have any questions or concerns about any information you see in [facility], please call the Health Information Department where you were seen or reach out to your Primary Care Provider for more information about your plan of care. Introducing [facility]! Brenna Shukla introduces [facility] [facility] patient portal. Now you can access parts of your medical record, email your doctor's office, and request medication refills online. 1. In your internet browser, go to [email or web address] 2. Click on the First Time User? Click Here link in the Sign In box. You will see the New Member Sign Up page. 3. Enter your [employer] Access Code exactly as it appears below. You will not need to use this code after youve completed the sign-up process. If you do not sign up before the expiration date, you must request a new code. · [employer] Access Code: [email or web address] Expires: 6/7/2017 11:23 AM 
 
4. Enter the last four digits of your Social Security Number (xxxx) and Date of Birth (mm/dd/yyyy) as indicated and click Submit. You will be taken to the next sign-up page. 5. Create a [employer] ID. This will be your [employer] login ID and cannot be changed, so think of one that is secure and easy to remember. 6. Create a [employer] password. You can change your password at any time. 7. Enter your Password Reset Question and Answer. This can be used at a later time if you forget your password. 8. Enter your e-mail address. You will receive e-mail notification when new information is available in [address] 9. Click Sign Up. You can now view and download portions of your medical record. 10. Click the Download Summary menu link to download a portable copy of your medical information. If you have questions, please visit the Frequently Asked Questions section of the [employer] website. Remember, [employer] is NOT to be used for urgent needs. For medical emergencies, dial 911. Now available from your iPhone and Android! General Information Please provide this summary of care documentation to your next provider. Patient Signature:  ____________________________________________________________ Date:  ____________________________________________________________  
  
Nick End Provider Signature:  ____________________________________________________________ Date:  ____________________________________________________________

## 2017-04-25 NOTE — PROGRESS NOTES
04/25/17 1350   Oxygen Therapy   O2 Sat (%) 99 %   Pulse via Oximetry 83 beats per minute   O2 Device Nasal cannula   O2 Flow Rate (L/min) 2 l/min  (weaned to room air)    Patient encouraged to do IS every hour while awake-patient agreed and demonstrated. No shortness of breath or distress noted. BS are clear b/l.    [facility] notes reviewed- continuous sat ordered HS

## 2017-04-26 ENCOUNTER — HOME HEALTH ADMISSION (OUTPATIENT)
Dept: HOME HEALTH SERVICES | Facility: HOME HEALTH | Age: 62
End: 2017-04-26
Payer: COMMERCIAL

## 2017-04-26 VITALS
DIASTOLIC BLOOD PRESSURE: 62 MMHG | HEIGHT: 69 IN | SYSTOLIC BLOOD PRESSURE: 110 MMHG | HEART RATE: 85 BPM | WEIGHT: 207.25 LBS | BODY MASS INDEX: 30.7 KG/M2 | TEMPERATURE: 96.9 F | RESPIRATION RATE: 18 BRPM | OXYGEN SATURATION: 97 %

## 2017-04-26 PROBLEM — Z96.659 S/P TOTAL KNEE ARTHROPLASTY: Status: ACTIVE | Noted: 2017-04-26

## 2017-04-26 LAB
ANION GAP BLD CALC-SCNC: 9 MMOL/L (ref 7–16)
BUN SERPL-MCNC: 11 MG/DL (ref 8–23)
CALCIUM SERPL-MCNC: 8.4 MG/DL (ref 8.3–10.4)
CHLORIDE SERPL-SCNC: 108 MMOL/L (ref 98–107)
CO2 SERPL-SCNC: 26 MMOL/L (ref 21–32)
CREAT SERPL-MCNC: 0.82 MG/DL (ref 0.8–1.5)
GLUCOSE SERPL-MCNC: 113 MG/DL (ref 65–100)
HGB BLD-MCNC: 11.4 G/DL (ref 13.6–17.2)
POTASSIUM SERPL-SCNC: 4.4 MMOL/L (ref 3.5–5.1)
SODIUM SERPL-SCNC: 143 MMOL/L (ref 136–145)

## 2017-04-26 PROCEDURE — 36415 COLL VENOUS BLD VENIPUNCTURE: CPT | Performed by: ORTHOPAEDIC SURGERY

## 2017-04-26 PROCEDURE — 74011250637 HC RX REV CODE- 250/637: Performed by: ORTHOPAEDIC SURGERY

## 2017-04-26 PROCEDURE — 97535 SELF CARE MNGMENT TRAINING: CPT

## 2017-04-26 PROCEDURE — 77030012935 HC DRSG AQUACEL BMS -B

## 2017-04-26 PROCEDURE — 97150 GROUP THERAPEUTIC PROCEDURES: CPT

## 2017-04-26 PROCEDURE — 97116 GAIT TRAINING THERAPY: CPT

## 2017-04-26 PROCEDURE — 85018 HEMOGLOBIN: CPT | Performed by: ORTHOPAEDIC SURGERY

## 2017-04-26 PROCEDURE — 77030012890

## 2017-04-26 PROCEDURE — 80048 BASIC METABOLIC PNL TOTAL CA: CPT | Performed by: ORTHOPAEDIC SURGERY

## 2017-04-26 PROCEDURE — 74011250636 HC RX REV CODE- 250/636: Performed by: ORTHOPAEDIC SURGERY

## 2017-04-26 PROCEDURE — 94760 N-INVAS EAR/PLS OXIMETRY 1: CPT

## 2017-04-26 RX ORDER — OXYCODONE HYDROCHLORIDE 5 MG/1
5-10 TABLET ORAL
Qty: 60 TAB | Refills: 0 | Status: SHIPPED | OUTPATIENT
Start: 2017-04-26 | End: 2019-02-19

## 2017-04-26 RX ORDER — ASPIRIN 81 MG/1
81 TABLET ORAL EVERY 12 HOURS
Qty: 60 TAB | Refills: 0 | Status: SHIPPED | OUTPATIENT
Start: 2017-04-26 | End: 2017-05-26

## 2017-04-26 RX ADMIN — ACETAMINOPHEN 1000 MG: 500 TABLET, FILM COATED ORAL at 06:23

## 2017-04-26 RX ADMIN — METFORMIN HYDROCHLORIDE: 500 TABLET, FILM COATED ORAL at 08:00

## 2017-04-26 RX ADMIN — ASPIRIN 81 MG: 81 TABLET, COATED ORAL at 09:00

## 2017-04-26 RX ADMIN — PRAVASTATIN SODIUM 2 TABLET: 20 TABLET ORAL at 09:00

## 2017-04-26 RX ADMIN — CEFAZOLIN 2 G: 1 INJECTION, POWDER, FOR SOLUTION INTRAMUSCULAR; INTRAVENOUS; PARENTERAL at 00:45

## 2017-04-26 RX ADMIN — Medication 10 ML: at 06:23

## 2017-04-26 RX ADMIN — CELECOXIB 200 MG: 200 CAPSULE ORAL at 09:00

## 2017-04-26 RX ADMIN — LEVOTHYROXINE SODIUM 88 MCG: 88 TABLET ORAL at 06:23

## 2017-04-26 NOTE — PROGRESS NOTES
Post op interview conducted following right total knee arthroplasty, patient states that he had some initial problems with nausea, was treated promptly and to his satisfaction, no anesthetic complications noted.

## 2017-04-26 NOTE — PROGRESS NOTES
Care Management Interventions  Mode of Transport at Discharge: Self  Transition of Care Consult (CM Consult): [address]: Yes  Discharge Durable Medical Equipment: Yes  Physical Therapy Consult: Yes  Occupational Therapy Consult: Yes  Current Support Network: Lives with Spouse  Confirm Follow Up Transport: Family  Plan discussed with Pt/Family/Caregiver: Yes  Freedom of Choice Offered: Yes  Discharge Location  Discharge Placement: Home with home health    Patient is a 59y.o. year old male admitted for Right TKA . Patient lives with His spouse and plans to return home on discharge. Order received to arrange home health. Patient without preference towards agency. [facility] Patient has a walker. Patient requesting we arrange a bedside commode. Referral sent to [facility] who will deliver to the hospital room prior to discharge. Will follow until discharge.   Oc Leyva

## 2017-04-26 NOTE — PROGRESS NOTES
Pt discharge summary and home medication sheet reviewed with pt and son and signed by pt. Copy  Given for take home use. RX for po oxycodone and po ASA given. aquacel dressing given for possible use by [facility]. Pt assessment unchanged. Pain to knee controlled. Pt voiding well. Appetite good with no n/v today. Pt left hospital via w/c with family and staff member.

## 2017-04-26 NOTE — PROGRESS NOTES
Problem: Self Care Deficits Care Plan (Adult)  Goal: *Acute Goals and Plan of Care (Insert Text)  GOALS:   DISCHARGE GOALS (in preparation for going home/rehab): 3 days  1. Mr. David Monsivais will perform one lower body dressing activity with minimal assistance required to demonstrate improved functional mobility and safety. met  2. Mr. David Monsivais will perform one lower body bathing activity with minimal assistance required to demonstrate improved functional mobility and safety. met  3. Mr. David Monsivais will perform toileting/toilet transfer with contact guard assistance to demonstrate improved functional mobility and safety. met  4. Mr. David Monsivais will perform shower transfer with contact guard assistance to demonstrate improved functional mobility and safety. met      JOINT CAMP OCCUPATIONAL THERAPY TKA: Daily Note 4/26/2017  INPATIENT: Hospital Day: 2  Payor: BLUE CROSS / [place] BLUE E[place]ENTIAL[place] JAMIE / Product Type: Earnest Crowe /      NAME/AGE/GENDER: Sneha Mock is a 59 y.o. male           PRIMARY DIAGNOSIS:  Unilateral primary osteoarthritis, right knee [M17.11]              Procedure(s) and Anesthesia Type:     * RIGHT KNEE ARTHROPLASTY TOTAL - Spinal (Right)  ICD-10: Treatment Diagnosis:        · Pain in Right Knee (M25.561)  · Stiffness of Right Knee, Not elsewhere classified (W42.217)       ASSESSMENT:      Mr. David Hernandez Sa is s/p R TKA and presents with decreased weight bearing on R LE and decreased independence with functional mobility and activities of daily living. Patient completed shower and dressing as charter below in ADL grid and is ambulating with rolling walker and contact guard assist.  Patient has met 4/4 goals and plans to return home with good family support. Family able to provide patient with appropriate level of assistance at this time. OT reviewed safety precautions throughout session and therapy schedule for the remainder of today.   Patient instructed to call for assistance when needing to get up from recliner and all needs in reach. Patient verbalized understanding of call light. This section established at most recent assessment   PROBLEM LIST (Impairments causing functional limitations):  1. Decreased Strength  2. Decreased ADL/Functional Activities  3. Decreased Transfer Abilities  4. Increased Pain  5. Increased Fatigue  6. Decreased Flexibility/Joint Mobility  7. Decreased Knowledge of Precautions    INTERVENTIONS PLANNED: (Benefits and precautions of occupational therapy have been discussed with the patient.)  1. Activities of daily living training  2. Adaptive equipment training  3. Balance training  4. Clothing management  5. Donning&doffing training  6. Theraputic activity      TREATMENT PLAN: Frequency/Duration: Follow patient 1-2 times to address above goals. Rehabilitation Potential For Stated Goals: GOOD      RECOMMENDED REHABILITATION/EQUIPMENT: (at time of discharge pending progress): Continue Skilled Therapy and Home Health: Physical Therapy. OCCUPATIONAL PROFILE AND HISTORY:   History of Present Injury/Illness (Reason for Referral): Pt presents this date s/p (right) TKA. Past Medical History/Comorbidities:   Mr. Sidney Oreilly  has a past medical history of Diabetes ([address] Hypertension; Hypothyroidism; and Osteoarthritis (4/25/2017). He also has no past medical history of Aneurysm ([address] Arrhythmia; Asthma; Autoimmune disease ([address] CAD (coronary artery disease); Cancer ([address] Chronic kidney disease; Chronic obstructive pulmonary disease ([address] Chronic pain; Coagulation disorder ([address] Difficult intubation; Endocarditis; GERD (gastroesophageal reflux disease); Heart failure ([address] Liver disease; Malignant hyperthermia due to anesthesia; Morbid obesity ([address] Nausea & vomiting; Pseudocholinesterase deficiency; Psychiatric disorder; PUD (peptic ulcer disease); Rheumatic fever; Seizures ([address] Sleep apnea; Stroke [facility]); or Thromboembolus ([address]   Mr. Sidney Oreilly  has no past surgical history on file. Social History/Living Environment:   Home Environment: Private residence  # Steps to Enter: 3  Rails to Enter: No  One/Two Story Residence: Two story, live on 1st floor  # of Interior Steps: 12  Interior Rails: Left  Living Alone: No  Support Systems: Child(gloria), Spouse/Significant Other/Partner  Patient Expects to be Discharged to[de-identified] Private residence  Current DME Used/Available at Home: None  Tub or Shower Type: Shower  Prior Level of Function/Work/Activity:  Independent       Number of Personal Factors/Comorbidities that affect the Plan of Care: Brief history (0):  LOW COMPLEXITY   ASSESSMENT OF OCCUPATIONAL PERFORMANCE[de-identified]   Most Recent Physical Functioning:   Balance  Sitting: Intact  Standing: With support        Patient Vitals for the past 6 hrs:   BP BP Patient Position SpO2 O2 Flow Rate (L/min) Pulse   04/26/17 0522 112/66 At rest 98 % - 76   04/26/17 0750 110/62 - 96 % - 85   04/26/17 0832 - - 97 % 0 l/min -                                   Mental Status  Neurologic State: Alert  Orientation Level: Oriented X4  Cognition: Follows commands  Perception: Appears intact  Perseveration: No perseveration noted  Safety/Judgement: Awareness of environment; Fall prevention                    Basic ADLs (From Assessment) Complex ADLs (From Assessment)   Basic ADL  Feeding: Independent  Oral Facial Hygiene/Grooming: Supervision  Bathing: Moderate assistance  Upper Body Dressing: Supervision  Lower Body Dressing: Moderate assistance  Toileting: Moderate assistance     Grooming/Bathing/Dressing Activities of Daily Living   Grooming  Grooming Assistance: Supervision/set up Cognitive Retraining  Safety/Judgement: Awareness of environment; Fall prevention   Upper Body Bathing  Bathing Assistance: Supervision/set-up  Position Performed: Seated in chair;Standing  Cues: Verbal cues provided  Adaptive Equipment: Grab bar; Shower chair Feeding  Feeding Assistance: Supervision/set-up   Lower [employer] Bathing  Bathing Assistance: Supervision/set-up  Perineal  : Supervision/set-up  Position Performed: Seated in chair;Standing  Cues: Verbal cues provided  Adaptive Equipment: Grab bar  Lower Body : Supervision/set-up  Position Performed: Bending forward method;Seated in chair  Cues: Verbal cues provided  Adaptive Equipment: Grab bar; Shower chair Toileting  Toileting Assistance: Supervision/set up   Upper Body Dressing Assistance  Dressing Assistance: Supervision/set-up  Pullover Shirt: Supervision/set-up Functional Transfers  Bathroom Mobility: Contact guard assistance  Toilet Transfer : Contact guard assistance  Shower Transfer: Contact guard assistance  Cues: Verbal cues provided  Adaptive Equipment: Bedside commode; Shower chair with back; Walker (comment);Grab bars   Lower Body Dressing Assistance  Dressing Assistance: Minimum assistance  Underpants: Minimum assistance  Pants With Elastic Waist: Minimum assistance  Socks: Compensatory technique training  Antiembolitic Stockings: Compensatory technique training Bed/Mat Mobility  Supine to Sit: Contact guard assistance  Sit to Stand: Contact guard assistance  Bed to Chair: Contact guard assistance           Physical Skills Involved:  1. Range of Motion  2. Balance  3. Mobility Cognitive Skills Affected (resulting in the inability to perform in a timely and safe manner): 1. none Psychosocial Skills Affected:  1. language barrier    Number of elements that affect the Plan of Care: 3-5:  MODERATE COMPLEXITY   CLINICAL DECISION MAKIN[address] [place] [address] [address] [id number] AM-PAC 6 Clicks   Basic Mobility Inpatient Short Form  How much help from another person does the patient currently need. .. Total A Lot A Little None   1. Putting on and taking off regular lower body clothing?   [ ] 1   [X] 2   [ ] 3   [ ] 4   2. Bathing (including washing, rinsing, drying)? [ ] 1   [X] 2   [ ] 3   [ ] 4   3.   Toileting, which includes using toilet, bedpan or urinal?   [ ] 1   [ ] 2   [X] 3   [ ] 4   4.  Putting on and taking off regular upper body clothing?   [ ] 1   [ ] 2   [ ] 3   [X] 4   5. Taking care of personal grooming such as brushing teeth? [ ] 1   [ ] 2   [ ] 3   [X] 4   6. Eating meals? [ ] 1   [ ] 2   [ ] 3   [X] 4   © 2007, Trustees of [address] [place] [address] [address] [address], under license to [employer]. All rights reserved   Score:  Initial: 19 Most Recent: X (Date: -- )     Interpretation of Tool:  Represents activities that are increasingly more difficult (i.e. Bed mobility, Transfers, Gait). Score 24 23 22-20 19-15 14-10 9-7 6       Modifier CH CI CJ CK CL CM CN         · Self Care:               - CURRENT STATUS:    CK - 40%-59% impaired, limited or restricted               - GOAL STATUS:           CJ - 20%-39% impaired, limited or restricted               - D/C STATUS:                       ---------------To be determined---------------  Payor: BLUE CROSS / [place] JAMIE / Product Type: JAMIE /       Medical Necessity:     · Patient is expected to demonstrate progress in range of motion, balance and functional technique to increase independence with self care. Reason for Services/Other Comments:  · Patient would benefit from skilled Occupational Therapy to maximize independence and safety with self-care task and functional mobility. .   Use of outcome tool(s) and clinical judgement create a POC that gives a: LOW COMPLEXITY                 TREATMENT:   (In addition to Assessment/Re-Assessment sessions the following treatments were rendered)      Pre-treatment Symptoms/Complaints:  No complaint of pain   Pain: Initial:   Pain Intensity 1: 0 0 Post Session:  0      Self Care: (40): Procedure(s) (per grid) utilized to improve and/or restore self-care/home management as related to dressing, bathing, toileting and grooming. Required minimal verbal and   cueing to facilitate activities of daily living skills and compensatory activities.      Treatment/Session Assessment:         Response to Treatment:  Pt doing great     Education:  [ ] Home Exercises  [X] Fall Precautions  [ ] Hip Precautions [ ] [address] Video  [X] Knee/Hip Prosthesis Review  [X] Walker Management/Safety [X] Adaptive Equipment as Needed         Interdisciplinary Collaboration:   · Physical Therapist  · Occupational Therapist  · Registered Nurse     After treatment position/precautions:   · Up in chair, Bed/Chair-wheels locked, Caregiver at bedside, Call light within reach and RN notified     Compliance with Program/Exercises: compliant and progressing     Recommendations/Intent for next treatment session:  Treatment next visit will focus on increasing Mr. Mabrys independence with bed mobility, transfers, self care training and patient education.        Total Treatment Duration:  OT Patient Time In/Time Out  Time In: 0900  Time Out: Cara Miller, OT

## 2017-04-26 NOTE — PROGRESS NOTES
Problem: Mobility Impaired (Adult and Pediatric)  Goal: *Acute Goals and Plan of Care (Insert Text)  GOALS (1-4 days):  (1.)Mr. Michelle Helm will move from supine to sit and sit to supine in bed with SUPERVISION. (2.)Mr. Michelle Helm will transfer from bed to chair and chair to bed with SUPERVISION using the least restrictive device. (3.)Mr. Michelle Helm will ambulate with SUPERVISION for 200 feet with the least restrictive device. (4.)Mr. Michelle Helm will ambulate up/down 3 steps with left railing with STAND BY ASSIST with no device. 4/26  (5.)Mr. Michelle Helm will increase right knee ROM to 5°-80°.4/26  ________________________________________________________________________________________________      PHYSICAL THERAPY JOINT CAMP TKA: INITIAL ASSESSMENT 4/26/2017  INPATIENT: Hospital Day: 2  Payor: BLUE CROSS / [place]UE ESSENTIAL[place] JAMIE / Product Type: Caro Ripa /      NAME/AGE/GENDER: Valentina Akins is a 59 y.o. male           PRIMARY DIAGNOSIS:  Unilateral primary osteoarthritis, right knee [M17.11]              Procedure(s) and Anesthesia Type:     * RIGHT KNEE ARTHROPLASTY TOTAL - Spinal (Right)  ICD-10: Treatment Diagnosis:        · Pain in Right Knee (M25.561)  · Stiffness of Right Knee, Not elsewhere classified (M25.661)  · Difficulty in walking, Not elsewhere classified (R26.2)       ASSESSMENT:      Mr. Michelle Helm presents s/p R TKA. Patient demonstrates decreased R LE strength and ROM and decreased independence with mobility. He would benefit from therapy services to improve his mobility and independence prior to returning home with family and home health services. He is pleasant this morning. He ambulates to the gym and back using RW with SBA and no LOB. He performs exercises in the gym without any problems. He is going to leave today. This section established at most recent assessment   PROBLEM LIST (Impairments causing functional limitations):  1. Decreased Strength  2.  Decreased ADL/Functional Activities  3. Decreased Transfer Abilities  4. Decreased Ambulation Ability/Technique  5. Decreased Balance    INTERVENTIONS PLANNED: (Benefits and precautions of physical therapy have been discussed with the patient.)  1. Bed Mobility  2. Gait Training  3. Home Exercise Program (HEP)  4. Therapeutic Exercise/Strengthening  5. Transfer Training  6. Range of Motion: active/assisted/passive  7. Therapeutic Activities  8. Group Therapy      TREATMENT PLAN: Frequency/Duration: Follow patient BID   to address above goals. Rehabilitation Potential For Stated Goals: GOOD      RECOMMENDED REHABILITATION/EQUIPMENT: (at time of discharge pending progress): Home Health: Physical Therapy. HISTORY:   History of Present Injury/Illness (Reason for Referral):  R TKA 4/25/17  Past Medical History/Comorbidities:   Mr. David Monsivais  has a past medical history of Diabetes ([address] Hypertension; Hypothyroidism; and Osteoarthritis (4/25/2017). He also has no past medical history of Aneurysm ([address] Arrhythmia; Asthma; Autoimmune disease ([address] CAD (coronary artery disease); Cancer ([address] Chronic kidney disease; Chronic obstructive pulmonary disease ([address] Chronic pain; Coagulation disorder ([address] Difficult intubation; Endocarditis; GERD (gastroesophageal reflux disease); Heart failure ([address] Liver disease; Malignant hyperthermia due to anesthesia; Morbid obesity ([address] Nausea & vomiting; Pseudocholinesterase deficiency; Psychiatric disorder; PUD (peptic ulcer disease); Rheumatic fever; Seizures ([address] Sleep apnea; Stroke [facility]); or Thromboembolus ([address] Mr. David Monsivais  has no past surgical history on file.   Social History/Living Environment:   Home Environment: Private residence  # Steps to Enter: 3  Rails to Enter: No  One/Two Story Residence: Two story, live on 1st floor  # of Interior Steps: 12  Interior Rails: Left  Living Alone: No  Support Systems: Child(gloria), Spouse/Significant Other/Partner  Patient Expects to be Discharged to[de-identified] Private residence  Current DME Used/Available at Home: None  Tub or Shower Type: Shower  Prior Level of Function/Work/Activity:  Independent and working. Number of Personal Factors/Comorbidities that affect the Plan of Care: 1-2: MODERATE COMPLEXITY   EXAMINATION:   Most Recent Physical Functioning:                RLE AROM  R Knee Flexion: 90  R Knee Extension: 5              Bed Mobility  Supine to Sit: Contact guard assistance     Transfers  Sit to Stand: Stand-by asssistance  Stand to Sit: Stand-by asssistance  Bed to Chair: Stand-by asssistance     Balance  Sitting: Intact  Standing: With support                Weight Bearing Status  Right Side Weight Bearing: As tolerated  Distance (ft): 242 Feet (ft) (x 2)  Ambulation - Level of Assistance: Stand-by asssistance  Assistive Device: Walker, rolling  Speed/Alexus: Delayed  Step Length: Left shortened;Right shortened  Stance: Right decreased  Number of Stairs Trained: 6  Stairs - Level of Assistance: Stand-by asssistance  Rail Use: Both  Interventions: Safety awareness training      Braces/Orthotics: none     Right Knee Cold  Type: Cryocuff       Body Structures Involved:  1. Joints  2. Muscles Body Functions Affected:  1. Movement Related Activities and Participation Affected:  1. Mobility  2. Self Care   Number of elements that affect the Plan of Care: 4+: HIGH COMPLEXITY   CLINICAL PRESENTATION:   Presentation: Stable and uncomplicated: LOW COMPLEXITY   CLINICAL DECISION MAKIN \[facility] [place] [facility]\"" [address] AM-PAC 6 Clicks   Basic Mobility Inpatient Short Form  How much difficulty does the patient currently have. .. Unable A Lot A Little None   1. Turning over in bed (including adjusting bedclothes, sheets and blankets)? [ ] 1   [ ] 2   [X] 3   [ ] 4   2. Sitting down on and standing up from a chair with arms ( e.g., wheelchair, bedside commode, etc.)   [ ] 1   [ ] 2   [X] 3   [ ] 4   3. Moving from lying on back to sitting on the side of the bed?    [ ] 1 [ ] 2   [X] 3   [ ] 4   How much help from another person does the patient currently need. .. Total A Lot A Little None   4. Moving to and from a bed to a chair (including a wheelchair)? [ ] 1   [ ] 2   [X] 3   [ ] 4   5. Need to walk in hospital room? [ ] 1   [ ] 2   [X] 3   [ ] 4   6. Climbing 3-5 steps with a railing? [ ] 1   [ ] 2   [X] 3   [ ] 4   © 2007, Trustees of [address] [place] [address] [address] [address], under license to [employer]. All rights reserved       Score:  Initial: 18 Most Recent: X (Date: -- )     Interpretation of Tool:  Represents activities that are increasingly more difficult (i.e. Bed mobility, Transfers, Gait). Score 24 23 22-20 19-15 14-10 9-7 6       Modifier CH CI CJ CK CL CM CN         · Mobility - Walking and Moving Around:               - CURRENT STATUS:    CK - 40%-59% impaired, limited or restricted               - GOAL STATUS:           CJ - 20%-39% impaired, limited or restricted               - D/C STATUS:                       ---------------To be determined---------------  Payor: BLUE CROSS / [place] BLUE [place]SSENTIALS JAMIE / Product Type: JAMIE /       Medical Necessity:     · Patient is expected to demonstrate progress in strength, range of motion, balance and coordination to increase independence with mobility and ADLs. · Patient demonstrates fair rehab potential due to higher previous functional level. Reason for Services/Other Comments:  · Patient continues to require skilled intervention due to decreased strength, ROM, and independence with mobility s/p TKA. Use of outcome tool(s) and clinical judgement create a POC that gives a: Clear prediction of patient's progress: LOW COMPLEXITY                 TREATMENT:   (In addition to Assessment/Re-Assessment sessions the following treatments were rendered)      Pre-treatment Symptoms/Complaints:  Patient with no complaints prior to session. Patient complained of nausea during session.   Pain: Initial:   Pain Intensity 1: 0 (0/10 after therapy)  Post Session:        Gait Training (15 Minutes):  Gait training to improve and/or restore physical functioning as related to mobility. Ambulated 242 Feet (ft) (x 2) with Stand-by asssistance using a Walker, rolling and minimal Safety awareness training related to their knee position and motion to promote proper body alignment. Therapeutic Exercise: (45 Minutes (group)):  Exercises per grid below to improve strength. Required minimal verbal cues to promote proper body alignment. Progressed range as indicated. Date:  4/26    Date:    Date:      ACTIVITY/EXERCISE AM PM AM PM AM PM   GROUP THERAPY  x[ ]  [ ]  [ ]  [ ]  [ ]  [ ]   Ankle Pumps 20              Quad Sets  20             Gluteal Sets  20             Hip ABd/ADduction  20             Straight Leg Raises  20             Knee Slides  20             Short Arc Quads  20             Long Arc Quads               Chair Slides  20                             B = bilateral; AA = active assistive; A = active; P = passive       Treatment/Session Assessment:         Response to Treatment:  He is doing well with gait and exercises. Education:  [ ] Home Exercises  [X] Fall Precautions  [ ] Hip Precautions [ ] Going Home Video  [ ] Knee/Hip Prosthesis Review  [ ] Elliott Whittaker Management/Safety [ ] Adaptive Equipment as Needed         Interdisciplinary Collaboration:   · Physical Therapy Assistant and Registered Nurse     After treatment position/precautions:   · Up in chair, Bed/Chair-wheels locked, Bed in low position, Call light within reach, Family at bedside and Side rails x 2     Compliance with Program/Exercises: Will assess as treatment progresses. Recommendations/Intent for next treatment session:  Treatment next visit will focus on increasing Mr. Mabrys independence with bed mobility, transfers, gait training, strength/ROM exercises, modalities for pain, and patient education.        Total Treatment Duration:  PT Patient Time In/Time Out  Time In: 1030  Time Out: Danielle 18 Neal, GUILLE

## 2017-04-26 NOTE — PROGRESS NOTES
[address]  Face to Face Encounter    Patients Name: Gokul Berrios    YOB: 1955    Ordering Physician: Ulysses Garcia    Primary Diagnosis: Unilateral primary osteoarthritis, right knee [M17.11]  S/p right TKA    Date of Face to Face:   4-25-17                                Face to Face Encounter findings are related to primary reason for home care:   yes. 1. I certify that the patient needs intermittent care as follows: physical therapy: gait/stair training    2. I certify that this patient is homebound, that is: 1) patient requires the use of a walker device, special transportation, or assistance of another to leave the home; or 2) patient's condition makes leaving the home medically contraindicated; and 3) patient has a normal inability to leave the home and leaving the home requires considerable and taxing effort. Patient may leave the home for infrequent and short duration for medical reasons, and occasional absences for non-medical reasons. Homebound status is due to the following functional limitations: Patient's ambulation limited secondary to severe pain and requires the use of an assistive device and the assistance of a caregiver for safe completion. Patient with strength and ROM deficits limiting ambulation endurance requiring the use of an assistive device and the assistance of a caregiver. Patient deemed temporarily homebound secondary to increased risk for infection when leaving home and going out into the community. 3. I certify that this patient is under my care and that I, or a nurse practitioner or [phone], or clinical nurse specialist, or certified nurse midwife, working with me, had a Face-to-Face Encounter that meets the physician Face-to-Face Encounter requirements.   The following are the clinical findings from the [address] [place] [address] encounter that support the need for skilled services and is a summary of the encounter: see hospital chart      Escindy Scherer Judith Parker, [address]  4/26/2017      THE FOLLOWING TO BE COMPLETED BY THE COMMUNITY PHYSICIAN:    I concur with the findings described above from the F2F encounter that this patient is homebound and in need of a skilled service.     Certifying Physician: _____________________________________      Printed Certifying Physician Name: _____________________________________    Date: _________________

## 2017-04-26 NOTE — DISCHARGE INSTRUCTIONS
[address] [facility]   Patient Discharge Instructions    Bryanna Jackson / [id number] : 1955    Admitted 2017 Discharged: 2017     IF YOU HAVE ANY PROBLEMS ONCE YOU ARE AT HOME CALL THE FOLLOWING NUMBERS:   Main office number: [phone]    Take Home Medications     · It is important that you take the medication exactly as they are prescribed. · Keep your medication in the bottles provided by the pharmacist and keep a list of the medication names, dosages, and times to be taken in your wallet. · Do not take other medications without consulting your doctor. What to do at [address] your prehospital diet. If you have excessive nausea or vomitting call your doctor's office     Home Physical Therapy is arranged. Use rolling walker when walking. Use Oli Hose stockings until we see you in the office for your follow up appointment with Dr. Nacho Aguilar. Patients who have had a joint replacement should not drive until you are seen for your follow up appointment by Dr. Nacho Aguilar. When to Call    - Call if you have a temperature greater then 101  - Unable to keep food down  - Loose control of your bladder or bowel function  - Are unable to bear any weight   - Need a pain medication refill       DISCHARGE SUMMARY from Nurse    The following personal items collected during your admission are returned to you:   Dental Appliance: Dental Appliances: None  Vision: Visual Aid: None  Hearing Aid:   na  Jewelry: Jewelry: None  Clothing: Clothing: At bedside  Other Valuables:  Other Valuables: Cell Phone (with son)  Valuables sent to safe:   na    PATIENT INSTRUCTIONS:    After general anesthesia or intravenous sedation, for 24 hours or while taking prescription Narcotics:  · Limit your activities  · Do not drive and operate hazardous machinery  · Do not make important personal or business decisions  · Do  not drink alcoholic beverages  · If you have not urinated within 8 hours after discharge, please contact your surgeon on call. Report the following to your surgeon:  · Excessive pain, swelling, redness or odor of or around the surgical area  · Temperature over 101  · Nausea and vomiting lasting longer than 4 hours or if unable to take medications  · Any signs of decreased circulation or nerve impairment to extremity: change in color, persistent  numbness, tingling, coldness or increase pain  · Any questions, call office @ [phone]      Keep scheduled follow up appointment. If need to change, call office @ [phone]. *  Please give a list of your current medications to your Primary Care Provider. *  Please update this list whenever your medications are discontinued, doses are      changed, or new medications (including over-the-counter products) are added. *  Please carry medication information at all times in case of emergency situations. Total Knee Replacement: What to Expect at [address] [place] [address]    When you leave the hospital, you should be able to move around with a walker or crutches. But you will need someone to help you at home for the next few weeks or until you have more energy and can move around better. If there is no one to help you at home, you may go to a rehabilitation center. You will go home with a bandage and stitches or staples. Change the bandage as your doctor tells you to. Your doctor will remove your stitches or staples 10 to 21 days after your surgery. You may still have some mild pain, and the area may be swollen for 3 to 6 months after surgery. Your knee will continue to improve for 6 to 12 months. You will probably use a walker for 1 to 3 weeks and then use crutches. When you are ready, you can use a cane. You will probably be able to walk on your own in 4 to 8 weeks. You will need to do months of physical rehabilitation (rehab) after a knee replacement. Rehab will help you strengthen the muscles of the knee and help you regain movement.  After you recover, your artificial knee will allow you to do normal daily activities with less pain or no pain at all. You may be able to hike, dance, ride a bike, and play golf. Talk to your doctor about whether you can do more strenuous activities. Always tell your caregivers that you have an artificial knee. How long it will take to walk on your own, return to normal activities, and go back to work depends on your health and how well your rehabilitation (rehab) program goes. The better you do with your rehab exercises, the quicker you will get your strength and movement back. This care sheet gives you a general idea about how long it will take for you to recover. But each person recovers at a different pace. Follow the steps below to get better as quickly as possible. How can you care for yourself at home? Activity  · Rest when you feel tired. You may take a nap, but do not stay in bed all day. When you sit, use a chair with arms. You can use the arms to help you stand up. · Work with your physical therapist to find the best way to exercise. You may be able to take frequent, short walks using crutches or a walker. What you can do as your knee heals will depend on whether your new knee is cemented or uncemented. You may not be able to do certain things for a while if your new knee is uncemented. · After your knee has healed enough, you can do more strenuous activities with caution. ¨ You can golf, but use a golf cart, and do not wear shoes with spikes. ¨ You can bike on a flat road or on a stationary bike. Avoid biking up hills. ¨ Your doctor may suggest that you stay away from activities that put stress on your knee. These include tennis or badminton, squash or racquetball, contact sports like football, jumping (such as in basketball), jogging, or running. ¨ Avoid activities where you might fall. These include horseback riding, skiing, and mountain biking. · Do not sit for more than 1 hour at a time.  Get up and walk around for a while before you sit again. If you must sit for a long time, prop up your leg with a chair or footstool. This will help you avoid swelling. · Ask your doctor when you can shower. You may need to take sponge baths until your stitches or staples have been removed. · Ask your doctor when you can drive again. It may take up to 8 weeks after knee replacement surgery before it is safe for you to drive. · When you get into a car, sit on the edge of the seat. Then pull in your legs, and turn to face the front. · You should be able to do many everyday activities 3 to 6 weeks after your surgery. You will probably need to take 4 to 16 weeks off from work. When you can go back to work depends on the type of work you do and how you feel. · Ask your doctor when it is okay for you to have sex. · Do not lift anything heavier than 10 pounds and do not lift weights for 12 weeks. Diet  · By the time you leave the hospital, you should be eating your normal diet. If your stomach is upset, try bland, low-fat foods like plain rice, broiled chicken, toast, and yogurt. Your doctor may suggest that you take iron and vitamin supplements. · Drink plenty of fluids (unless your doctor tells you not to). · Eat healthy foods, and watch your portion sizes. Try to stay at your ideal weight. Too much weight puts more stress on your new knee. · You may notice that your bowel movements are not regular right after your surgery. This is common. Try to avoid constipation and straining with bowel movements. You may want to take a fiber supplement every day. If you have not had a bowel movement after a couple of days, ask your doctor about taking a mild laxative. Medicines  · Your doctor will tell you if and when you can restart your medicines. He or she will also give you instructions about taking any new medicines.   · If you take blood thinners, such as warfarin (Coumadin), clopidogrel (Plavix), or aspirin, be sure to talk to your doctor. He or she will tell you if and when to start taking those medicines again. Make sure that you understand exactly what your doctor wants you to do. · Your doctor may give you a blood-thinning medicine to prevent blood clots. If you take a blood thinner, be sure you get instructions about how to take your medicine safely. Blood thinners can cause serious bleeding problems. This medicine could be in pill form or as a shot (injection). If a shot is necessary, your doctor will tell you how to do this. · Be safe with medicines. Take pain medicines exactly as directed. ¨ If the doctor gave you a prescription medicine for pain, take it as prescribed. ¨ If you are not taking a prescription pain medicine, ask your doctor if you can take an over-the-counter medicine. ¨ Plan to take your pain medicine 30 minutes before exercises. It is easier to prevent pain before it starts than to stop it once it has started. · If you think your pain medicine is making you sick to your stomach:  ¨ Take your medicine after meals (unless your doctor has told you not to). ¨ Ask your doctor for a different pain medicine. · If your doctor prescribed antibiotics, take them as directed. Do not stop taking them just because you feel better. You need to take the full course of antibiotics. Incision care  · You will have a bandage over the cut (incision) and staples or stitches. Take the bandage off when your doctor says it is okay. · Your doctor will remove the staples or stitches 10 days to 3 weeks after the surgery and replace them with strips of tape. Leave the tape on for a week or until it falls off. Exercise  · Your rehab program will give you a number of exercises to do to help you get back your knee's range of motion and strength. Always do them as your therapist tells you. Ice and elevation  · For pain and swelling, put ice or a cold pack on the area for 10 to 20 minutes at a time.  Put a thin cloth between the ice and your skin. Other instructions  · Continue to wear your support stockings as your doctor says. These help to prevent blood clots. The length of time that you will have to wear them depends on your activity level and the amount of swelling. · Wear medical alert jewelry that says you may need antibiotics before any procedure, including dental work. You can buy this at most drugstores. · You have metal pieces in your knee. These may set off some airport metal detectors. Carry a medical alert card that says you have an artificial joint, just in case. Follow-up care is a key part of your treatment and safety. Be sure to make and go to all appointments, and call your doctor if you are having problems. It's also a good idea to know your test results and keep a list of the medicines you take. When should you call for help? Call 911 anytime you think you may need emergency care. For example, call if:  · You passed out (lost consciousness). · You have severe trouble breathing. · You have sudden chest pain and shortness of breath, or you cough up blood. Call your doctor now or seek immediate medical care if:  · You have signs of infection, such as:  ¨ Increased pain, swelling, warmth, or redness. ¨ Red streaks leading from the incision. ¨ Pus draining from the incision. ¨ A fever. · You have signs of a blood clot, such as:  ¨ Pain in your calf, back of the knee, thigh, or groin. ¨ Redness and swelling in your leg or groin. · Your incision comes open and begins to bleed, or the bleeding increases. · You have pain that does not get better after you take pain medicine. Watch closely for changes in your health, and be sure to contact your doctor if:  · You do not have a bowel movement after taking a laxative. Where can you learn more? Go to http://genie-brooke.info/. Enter N523 in the search box to learn more about \"Total Knee Replacement: What to Expect at Home. \"  Current as of: August 4, 2016  Content Version: 11.2  © 1696-7909 [employer]. Care instructions adapted under license by [employer] (which disclaims liability or warranty for this information). If you have questions about a medical condition or this instruction, always ask your healthcare professional. [address] any warranty or liability for your use of this information. These are general instructions for a healthy lifestyle:    No smoking/ No tobacco products/ Avoid exposure to second hand smoke    Surgeon General's Warning:  Quitting smoking now greatly reduces serious risk to your health. Obesity, smoking, and sedentary lifestyle greatly increases your risk for illness    A healthy diet, regular physical exercise & weight monitoring are important for maintaining a healthy lifestyle    You may be retaining fluid if you have a history of heart failure or if you experience any of the following symptoms:  Weight gain of 3 pounds or more overnight or 5 pounds in a week, increased swelling in our hands or feet or shortness of breath while lying flat in bed. Please call your doctor as soon as you notice any of these symptoms; do not wait until your next office visit. Recognize signs and symptoms of STROKE:    F-face looks uneven    A-arms unable to move or move even    S-speech slurred or non-existent    T-time-call 911 as soon as signs and symptoms begin-DO NOT go       Back to bed or wait to see if you get better-TIME IS BRAIN. The discharge information has been reviewed with the patient. The patient verbalized understanding. Information obtained by :  I understand that if any problems occur once I am at home I am to contact my physician. I understand and acknowledge receipt of the instructions indicated above.                                                                                                                                            Physician's or R.N.'s Signature                                                                  Date/Time                                                                                                                                              Patient or Representative Signature                                                          Date/Time

## 2017-04-26 NOTE — PROGRESS NOTES
Patient is A&Ox4. Able to verbalize needs. Resting quietly with no distress noted. Dressing to surgical site is dry and intact. Neurovascular and peripheral vascular checks WNL. Santiago draining clear yellow urine to bag. Denies needs. Bed low and locked. Call light within reach. Instructed to call for assistance. Patient verbalizes understanding. Will monitor.

## 2017-04-26 NOTE — DISCHARGE SUMMARY
[facility]  Total Joint Discharge Summary      Patient ID:  Sarwat Weston  [id number]  53 y.o.  1955    Admit date: 4/25/2017  Discharge date and time: 4-27-17  Admitting Physician: Desiree Castillo MD  Surgeon: Same  Admission Diagnoses: Unilateral primary osteoarthritis, right knee [M17.11]  Discharge Diagnoses: Principal Problem:    S/P total knee arthroplasty (4/26/2017)    Active Problems:    Osteoarthritis (4/25/2017)                                Perioperative Antibiotics: Ancef 1 to 2 mg was given depending on patient's weight. If allergic to Ancef or due to other indications, patient was given Vancomycin. Hospital Medications given:   [unfilled]  [unfilled]  [unfilled]    Discharge Medications given:  Current Discharge Medication List      START taking these medications    Details   aspirin delayed-release 81 mg tablet Take 1 Tab by mouth every twelve (12) hours every twelve (12) hours for 30 days. Qty: 60 Tab, Refills: 0      oxyCODONE IR (ROXICODONE) 5 mg immediate release tablet Take 1-2 Tabs by mouth every three (3) hours as needed. Max Daily Amount: 80 mg.  Qty: 60 Tab, Refills: 0         CONTINUE these medications which have NOT CHANGED    Details   amLODIPine (NORVASC) 5 mg tablet Take 5 mg by mouth nightly. Indications: hypertension      glyBURIDE-metFORMIN (GLUCOVANCE) 1. mg per tablet Take 1 Tab by mouth two (2) times a day. Takes in morning and at 7 pm   Indications: type 2 diabetes mellitus      levothyroxine (SYNTHROID) 88 mcg tablet Take  by mouth Daily (before breakfast). Take / use AM day of surgery  per anesthesia protocols. Indications: hypothyroidism      valsartan (DIOVAN) 80 mg tablet Take 80 mg by mouth daily. Indications: hypertension      ketoconazole (NIZORAL) 2 % shampoo Apply  to affected area daily as needed for Itching.          STOP taking these medications       VIT C/E/ZN/COPPR/LUTEIN/ZEAXAN (PRESERVISION AREDS 2 PO) Comments: Reason for Stopping:         mupirocin calcium (BACTROBAN) 2 % nasal ointment Comments:   Reason for Stopping:         glucosam-chondr msm#6-manganes 467-438-0.7 mg cap Comments:   Reason for Stopping:         aspirin 81 mg chewable tablet Comments:   Reason for Stopping:                Additional DVT Prophylaxis:  DWIGHT Hose,Plexi-Pulse    Postoperative transfusions:   none  Post Op complications: none    Hemoglobin at discharge:   Lab Results   Component Value Date/Time    HGB 11.4 04/26/2017 04:20 AM       Wound appears to be healing without any evidence of infection. Physical Therapy started on the day following surgery and progressed to independent ambulation with the aid of a walker. At the time of discharge, able to go up and down stairs and had understanding of precautions needed following surgery.       PT/OT:            Assistive Device: Walker (comment)                Discharged to: home    Discharge instructions:  -Rx pain medication given  - Anticoagulate with: Ecotrin 81 mg PO BID x 4 weeks  -Resume pre hospital diet             -Resume home medications per medical continuation form     -Ambulate with walker, appropriate total joint protocol  -Follow up in office as scheduled       Signed:  VANESSA Guidry  4/26/2017  7:24 AM

## 2017-04-26 NOTE — PROGRESS NOTES
2017         Post Op day: 1 Day Post-Op     Admit Date: 2017  Admit Diagnosis: Unilateral primary osteoarthritis, right knee [M17.11]        Subjective: Doing well, No complaints, No SOB, No Chest Pain, No Nausea or Vomiting     Objective:   Vital Signs are Stable, No Acute Distress, Alert and Oriented, Dressing is Dry,  Neurovascular exam is normal.     Assessment / Plan :  Patient Active Problem List   Diagnosis Code    Osteoarthritis M19.90    S/P total knee arthroplasty Z96.659    Patient Vitals for the past 8 hrs:   BP Temp Pulse Resp SpO2   17 0522 112/66 97.9 °F (36.6 °C) 76 18 98 %   17 2351 110/65 98.5 °F (36.9 °C) 80 16 96 %    Temp (24hrs), Av.4 °F (36.3 °C), Min:96.2 °F (35.7 °C), Max:98.7 °F (37.1 °C)    Body mass index is 30.61 kg/(m^2).     Lab Results   Component Value Date/Time    HGB 11.4 2017 04:20 AM      Pt seen by and discussed with [address] [place] [address] for home tomorrow morning       Signed By: Marsh Babinski, PA

## 2017-04-27 ENCOUNTER — HOME CARE VISIT (OUTPATIENT)
Dept: SCHEDULING | Facility: HOME HEALTH | Age: 62
End: 2017-04-27
Payer: COMMERCIAL

## 2017-04-27 VITALS
SYSTOLIC BLOOD PRESSURE: 122 MMHG | WEIGHT: 204 LBS | OXYGEN SATURATION: 98 % | RESPIRATION RATE: 18 BRPM | HEIGHT: 68 IN | TEMPERATURE: 98.7 F | BODY MASS INDEX: 30.92 KG/M2 | HEART RATE: 68 BPM | DIASTOLIC BLOOD PRESSURE: 60 MMHG

## 2017-04-27 PROCEDURE — G0151 HHCP-SERV OF PT,EA 15 MIN: HCPCS

## 2017-04-27 PROCEDURE — 400013 HH SOC

## 2017-05-01 ENCOUNTER — HOME CARE VISIT (OUTPATIENT)
Dept: SCHEDULING | Facility: HOME HEALTH | Age: 62
End: 2017-05-01
Payer: COMMERCIAL

## 2017-05-01 VITALS
TEMPERATURE: 98.6 F | RESPIRATION RATE: 18 BRPM | SYSTOLIC BLOOD PRESSURE: 118 MMHG | DIASTOLIC BLOOD PRESSURE: 60 MMHG | HEART RATE: 88 BPM

## 2017-05-01 PROCEDURE — G0157 HHC PT ASSISTANT EA 15: HCPCS

## 2017-05-03 ENCOUNTER — HOME CARE VISIT (OUTPATIENT)
Dept: SCHEDULING | Facility: HOME HEALTH | Age: 62
End: 2017-05-03
Payer: COMMERCIAL

## 2017-05-03 VITALS
HEART RATE: 90 BPM | RESPIRATION RATE: 17 BRPM | SYSTOLIC BLOOD PRESSURE: 130 MMHG | TEMPERATURE: 98.2 F | DIASTOLIC BLOOD PRESSURE: 82 MMHG

## 2017-05-03 PROCEDURE — G0157 HHC PT ASSISTANT EA 15: HCPCS

## 2017-05-05 ENCOUNTER — HOME CARE VISIT (OUTPATIENT)
Dept: SCHEDULING | Facility: HOME HEALTH | Age: 62
End: 2017-05-05
Payer: COMMERCIAL

## 2017-05-05 VITALS
SYSTOLIC BLOOD PRESSURE: 138 MMHG | TEMPERATURE: 97.9 F | RESPIRATION RATE: 17 BRPM | HEART RATE: 88 BPM | DIASTOLIC BLOOD PRESSURE: 70 MMHG

## 2017-05-05 PROCEDURE — G0157 HHC PT ASSISTANT EA 15: HCPCS

## 2017-05-08 ENCOUNTER — HOME CARE VISIT (OUTPATIENT)
Dept: SCHEDULING | Facility: HOME HEALTH | Age: 62
End: 2017-05-08
Payer: COMMERCIAL

## 2017-05-08 VITALS
RESPIRATION RATE: 17 BRPM | DIASTOLIC BLOOD PRESSURE: 70 MMHG | HEART RATE: 90 BPM | TEMPERATURE: 97.5 F | SYSTOLIC BLOOD PRESSURE: 138 MMHG

## 2017-05-08 PROCEDURE — G0157 HHC PT ASSISTANT EA 15: HCPCS

## 2017-05-09 PROCEDURE — A4247 BETADINE/IODINE SWABS/WIPES: HCPCS

## 2017-05-09 PROCEDURE — A4649 SURGICAL SUPPLIES: HCPCS

## 2017-05-10 ENCOUNTER — HOME CARE VISIT (OUTPATIENT)
Dept: SCHEDULING | Facility: HOME HEALTH | Age: 62
End: 2017-05-10
Payer: COMMERCIAL

## 2017-05-10 VITALS
HEART RATE: 85 BPM | TEMPERATURE: 97.8 F | SYSTOLIC BLOOD PRESSURE: 140 MMHG | DIASTOLIC BLOOD PRESSURE: 80 MMHG | RESPIRATION RATE: 18 BRPM

## 2017-05-10 PROCEDURE — G0157 HHC PT ASSISTANT EA 15: HCPCS

## 2017-05-12 ENCOUNTER — HOME CARE VISIT (OUTPATIENT)
Dept: SCHEDULING | Facility: HOME HEALTH | Age: 62
End: 2017-05-12
Payer: COMMERCIAL

## 2017-05-12 VITALS
RESPIRATION RATE: 18 BRPM | SYSTOLIC BLOOD PRESSURE: 134 MMHG | HEART RATE: 80 BPM | DIASTOLIC BLOOD PRESSURE: 80 MMHG | TEMPERATURE: 97 F

## 2017-05-12 PROCEDURE — G0157 HHC PT ASSISTANT EA 15: HCPCS

## 2017-05-15 ENCOUNTER — HOME CARE VISIT (OUTPATIENT)
Dept: SCHEDULING | Facility: HOME HEALTH | Age: 62
End: 2017-05-15
Payer: COMMERCIAL

## 2017-05-15 VITALS
DIASTOLIC BLOOD PRESSURE: 78 MMHG | TEMPERATURE: 98.3 F | RESPIRATION RATE: 17 BRPM | SYSTOLIC BLOOD PRESSURE: 138 MMHG | HEART RATE: 91 BPM

## 2017-05-15 PROCEDURE — G0157 HHC PT ASSISTANT EA 15: HCPCS

## 2017-05-17 ENCOUNTER — HOME CARE VISIT (OUTPATIENT)
Dept: SCHEDULING | Facility: HOME HEALTH | Age: 62
End: 2017-05-17
Payer: COMMERCIAL

## 2017-05-17 VITALS
DIASTOLIC BLOOD PRESSURE: 78 MMHG | HEART RATE: 83 BPM | SYSTOLIC BLOOD PRESSURE: 130 MMHG | RESPIRATION RATE: 17 BRPM | TEMPERATURE: 97.4 F

## 2017-05-17 PROCEDURE — G0157 HHC PT ASSISTANT EA 15: HCPCS

## 2017-05-18 ENCOUNTER — HOME CARE VISIT (OUTPATIENT)
Dept: SCHEDULING | Facility: HOME HEALTH | Age: 62
End: 2017-05-18
Payer: COMMERCIAL

## 2017-05-18 PROCEDURE — G0151 HHCP-SERV OF PT,EA 15 MIN: HCPCS

## 2017-05-19 VITALS
RESPIRATION RATE: 18 BRPM | DIASTOLIC BLOOD PRESSURE: 76 MMHG | OXYGEN SATURATION: 98 % | SYSTOLIC BLOOD PRESSURE: 120 MMHG | HEART RATE: 100 BPM | TEMPERATURE: 97.8 F

## 2019-02-19 ENCOUNTER — HOSPITAL ENCOUNTER (OUTPATIENT)
Dept: PHYSICAL THERAPY | Age: 64
Discharge: HOME | End: 2019-02-19
Payer: MEDICARE

## 2019-02-19 ENCOUNTER — HOSPITAL ENCOUNTER (OUTPATIENT)
Dept: SURGERY | Age: 64
Discharge: HOME | End: 2019-02-19
Payer: MEDICARE

## 2019-02-19 VITALS
WEIGHT: 210 LBS | DIASTOLIC BLOOD PRESSURE: 86 MMHG | OXYGEN SATURATION: 99 % | RESPIRATION RATE: 18 BRPM | BODY MASS INDEX: 31.83 KG/M2 | HEIGHT: 68 IN | SYSTOLIC BLOOD PRESSURE: 151 MMHG | TEMPERATURE: 98 F | HEART RATE: 77 BPM

## 2019-02-19 LAB
ANION GAP SERPL CALC-SCNC: 7 MMOL/L
APPEARANCE UR: CLEAR
APTT PPP: 27.6 SEC (ref 24.7–39.8)
ATRIAL RATE: 79 BPM
BACTERIA SPEC CULT: ABNORMAL
BACTERIA URNS QL MICRO: 0 /HPF
BASOPHILS # BLD: 0 K/UL (ref 0–0.2)
BASOPHILS NFR BLD: 0 % (ref 0–2)
BILIRUB UR QL: NEGATIVE
BUN SERPL-MCNC: 9 MG/DL (ref 8–23)
CALCIUM SERPL-MCNC: 8.7 MG/DL (ref 8.3–10.4)
CALCULATED P AXIS, ECG09: 53 DEGREES
CALCULATED R AXIS, ECG10: 13 DEGREES
CALCULATED T AXIS, ECG11: 44 DEGREES
CASTS URNS QL MICRO: ABNORMAL /LPF
CHLORIDE SERPL-SCNC: 108 MMOL/L (ref 98–107)
CO2 SERPL-SCNC: 24 MMOL/L (ref 21–32)
COLOR UR: YELLOW
CREAT SERPL-MCNC: 0.9 MG/DL (ref 0.8–1.5)
DIAGNOSIS, 93000: NORMAL
DIFFERENTIAL METHOD BLD: ABNORMAL
EOSINOPHIL # BLD: 0.1 K/UL (ref 0–0.8)
EOSINOPHIL NFR BLD: 2 % (ref 0.5–7.8)
EPI CELLS #/AREA URNS HPF: ABNORMAL /HPF
ERYTHROCYTE [DISTWIDTH] IN BLOOD BY AUTOMATED COUNT: 12.6 % (ref 11.9–14.6)
GLUCOSE SERPL-MCNC: 207 MG/DL (ref 65–100)
GLUCOSE UR STRIP.AUTO-MCNC: NEGATIVE MG/DL
HCT VFR BLD AUTO: 40.7 % (ref 41.1–50.3)
HGB BLD-MCNC: 13.5 G/DL (ref 13.6–17.2)
HGB UR QL STRIP: ABNORMAL
IMM GRANULOCYTES # BLD AUTO: 0 K/UL (ref 0–0.5)
IMM GRANULOCYTES NFR BLD AUTO: 0 % (ref 0–5)
INR PPP: 1.1
KETONES UR QL STRIP.AUTO: NEGATIVE MG/DL
LEUKOCYTE ESTERASE UR QL STRIP.AUTO: NEGATIVE
LYMPHOCYTES # BLD: 1.2 K/UL (ref 0.5–4.6)
LYMPHOCYTES NFR BLD: 23 % (ref 13–44)
MCH RBC QN AUTO: 30.1 PG (ref 26.1–32.9)
MCHC RBC AUTO-ENTMCNC: 33.2 G/DL (ref 31.4–35)
MCV RBC AUTO: 90.6 FL (ref 79.6–97.8)
MONOCYTES # BLD: 0.5 K/UL (ref 0.1–1.3)
MONOCYTES NFR BLD: 8 % (ref 4–12)
NEUTS SEG # BLD: 3.6 K/UL (ref 1.7–8.2)
NEUTS SEG NFR BLD: 66 % (ref 43–78)
NITRITE UR QL STRIP.AUTO: NEGATIVE
NRBC # BLD: 0 K/UL (ref 0–0.2)
P-R INTERVAL, ECG05: 190 MS
PH UR STRIP: 5.5 [PH] (ref 5–9)
PLATELET # BLD AUTO: 241 K/UL (ref 150–450)
PMV BLD AUTO: 9 FL (ref 9.4–12.3)
POTASSIUM SERPL-SCNC: 3.9 MMOL/L (ref 3.5–5.1)
PROT UR STRIP-MCNC: NEGATIVE MG/DL
PROTHROMBIN TIME: 14.2 SEC (ref 11.7–14.5)
Q-T INTERVAL, ECG07: 362 MS
QRS DURATION, ECG06: 96 MS
QTC CALCULATION (BEZET), ECG08: 415 MS
RBC # BLD AUTO: 4.49 M/UL (ref 4.23–5.6)
RBC #/AREA URNS HPF: ABNORMAL /HPF
SERVICE CMNT-IMP: ABNORMAL
SODIUM SERPL-SCNC: 139 MMOL/L (ref 136–145)
SP GR UR REFRACTOMETRY: 1.02 (ref 1–1.02)
UROBILINOGEN UR QL STRIP.AUTO: 0.2 EU/DL (ref 0.2–1)
VENTRICULAR RATE, ECG03: 79 BPM
WBC # BLD AUTO: 5.4 K/UL (ref 4.3–11.1)
WBC URNS QL MICRO: ABNORMAL /HPF

## 2019-02-19 PROCEDURE — 85025 COMPLETE CBC W/AUTO DIFF WBC: CPT

## 2019-02-19 PROCEDURE — 81001 URINALYSIS AUTO W/SCOPE: CPT

## 2019-02-19 PROCEDURE — 77030027138 HC INCENT SPIROMETER -A

## 2019-02-19 PROCEDURE — 97161 PT EVAL LOW COMPLEX 20 MIN: CPT

## 2019-02-19 PROCEDURE — 93005 ELECTROCARDIOGRAM TRACING: CPT | Performed by: ANESTHESIOLOGY

## 2019-02-19 PROCEDURE — 36415 COLL VENOUS BLD VENIPUNCTURE: CPT

## 2019-02-19 PROCEDURE — 87641 MR-STAPH DNA AMP PROBE: CPT

## 2019-02-19 PROCEDURE — 85730 THROMBOPLASTIN TIME PARTIAL: CPT

## 2019-02-19 PROCEDURE — 80048 BASIC METABOLIC PNL TOTAL CA: CPT

## 2019-02-19 PROCEDURE — 85610 PROTHROMBIN TIME: CPT

## 2019-02-19 RX ORDER — ASPIRIN 81 MG/1
TABLET ORAL
COMMUNITY
End: 2019-03-13

## 2019-02-19 RX ORDER — ACETAMINOPHEN, DIPHENHYDRAMINE HCL, PHENYLEPHRINE HCL 325; 25; 5 MG/1; MG/1; MG/1
TABLET ORAL DAILY
COMMUNITY

## 2019-02-19 RX ORDER — GLUCOSAMINE/CHONDR SU A SOD 167-133 MG
500 CAPSULE ORAL
COMMUNITY

## 2019-02-19 RX ORDER — LOSARTAN POTASSIUM 25 MG/1
25 TABLET ORAL DAILY
COMMUNITY

## 2019-02-19 RX ORDER — LANOLIN ALCOHOL/MO/W.PET/CERES
400 CREAM (GRAM) TOPICAL DAILY
COMMUNITY

## 2019-02-19 NOTE — PERIOP NOTES
Patient verified name and . Order for consent found in EHR and matches case posting; patient verified. Type 3 surgery, PAT joint assessment complete. Labs per surgeon: cbc, bmp, ua, pt/inr, ptt; results within anesthesia limits. T&S DOS; order signed and held in EHR. Labs per anesthesia protocol: All required lab work included in surgeon's orders. EKG: completed 19; results within anesthesia limits. MRSA/MSSA swab collected; pharmacy to review and dose antibiotic as appropriate. Hibiclens and instructions to return bottle on DOS given per hospital policy. Patient provided with handouts including Guide to Surgery, Pain Management, Hand Hygiene, Blood Transfusion Education, and [facility] Brochure. Patient answered medical/surgical history questions at their best of ability. All prior to admission medications documented in [facility]. Original medication prescription bottle NOT visualized during patient appointment. Patient instructed to hold all vitamins 7 days prior to surgery and NSAIDS 5 days prior to surgery. Patient instructed to continue nightly 81 mg ASA and ok to take Tylenol PRN. Patient instructed to continue previous medications as prescribed prior to surgery and to take the following medications the day of surgery according to anesthesia guidelines with a small sip of water: Synthroid. Patient teach back successful and patient demonstrates knowledge of instruction.

## 2019-02-19 NOTE — PROGRESS NOTES
Grace Frankel : 1955(63 y.o.) [facility] at [address], [address] [place] [address] [phone] Physical Therapy Prehab Plan of Treatment and Evaluation Summary:2019 ICD-10: Treatment Diagnosis:  
· Pain in Left Knee (M25.562) · Difficulty in walking, Not elsewhere classified (R26.2) Precautions/Allergies:  
Patient has no known allergies. MEDICAL/REFERRING DIAGNOSIS: 
Unilateral primary osteoarthritis, left knee [M17.12] REFERRING PHYSICIAN: Zoran Farias MD 
DATE OF SURGERY: 3/12/19 Assessment:  
Comments:  Patient presents prior to L TKA. Patient underwent R TKA in . He reports he had gel injections in November which have helped with his pain. He plans on returning home at d/c with assist from his family. PROBLEM LIST (Impacting functional limitations): 
Mr. Es Ortiz presents with the following left lower extremity(s) problems: 
1. Gait 2. Home Exercise Program 
3. Pain INTERVENTIONS PLANNED: 
1. Home Exercise Program 
2. Educational Discussion TREATMENT PLAN: Effective Dates: 2019 TO 2019. Frequency/Duration: Patient to continue to perform home exercise program at least twice per day up until his surgery. GOALS: (Goals have been discussed and agreed upon with patient.) Discharge Goals: Time Frame: 1 Day 1. Patient will demonstrate independence with a home exercise program designed to increase strength, range of motion, balance, coordination and pain control to minimize functional deficits and optimize patient for total joint replacement. Rehabilitation Potential For Stated Goals: Good Regarding Serafin Richmond's therapy, I certify that the treatment plan above will be carried out by a therapist or under their direction. Thank you for this referral, 
Nori Carpio, PT     
    
 
 
HISTORY:  
Present Symptoms: 
Pain Intensity 1: 1 Pain Location 1: Knee Pain Orientation 1: Left History of Present Injury/Illness (Reason for Referral): 
Medical/Referring Diagnosis: Unilateral primary osteoarthritis, left knee [M17.12] Past Medical History/Comorbidities:  
Mr. Bettina Alba  has a past medical history of Diabetes ([address] Hypertension, Hypothyroidism, and Osteoarthritis (4/25/2017). He also has no past medical history of Aneurysm ([address] Arrhythmia, Asthma, Autoimmune disease ([address] CAD (coronary artery disease), Cancer ([address] Chronic kidney disease, Chronic obstructive pulmonary disease ([address] Chronic pain, Coagulation disorder ([address] Endocarditis, GERD (gastroesophageal reflux disease), Heart failure ([address] Liver disease, Morbid obesity ([address] Psychiatric disorder, PUD (peptic ulcer disease), Rheumatic fever, Seizures ([address] Sleep apnea, Stroke ([address] or Thromboembolus ([address] Mr. Bettina Alba  has a past surgical history that includes hx knee replacement (Right, 04/25/2017). Social History/Living Environment:  
Home Environment: Private residence # Steps to Enter: 0 One/Two Story Residence: Two story, live on 1st floor # of Interior Steps: 10 Interior Rails: Left Living Alone: No 
Support Systems: Child(gloria), Spouse/Significant Other/Partner Patient Expects to be Discharged to[de-identified] Private residence Current DME Used/Available at Home: Cane, straight, Commode, bedside Tub or Shower Type: Shower Work/Activity: 
Patient is independent and retired. Dominant Side: 
RIGHT Current Medications:  See Pre-assessment nursing note Number of Personal Factors/Comorbidities that affect the Plan of Care: 0: LOW COMPLEXITY EXAMINATION:  
ADLs (Current Functional Status):  
Ambulation: 
[x] Independent 
[] Walk Indoors Only 
[] Walk Outdoors [] Use Assistive Device [] Use Wheelchair Only Dressing: 
[x] Independent Requires Assistance from Someone for: 
[] Sock/Shoes 
[] Pants 
[] Everything Bathing/Showering:  
[x] Independent 
[] Requires Assistance from Someone [] Sponge Bath Only Household Activities: 
[] Routine house and yard work [x] Light Housework Only 
[] None Observation/Orthostatic Postural Assessment:  
   
ROM/Flexibility:  
Gross Assessment: Yes AROM: Within functional limits(R LE) LLE Assessment LLE Assessment (WDL): Exception to WDL 
LLE AROM 
L Knee Flexion: 127 L Knee Extension: 0 Strength:  
Gross Assessment: Yes 
Strength: Within functional limits(R LE 5/5) LLE Strength L Hip Flexion: 5 L Knee Flexion: 5 L Knee Extension: 5 Functional Mobility:   
Gross Assessment: Yes Coordination: Within functional limits Gait Description (WDL): Exceptions to [facility] Stand to Sit: Independent Sit to Stand: Independent Distance (ft): 500 Feet (ft) Ambulation - Level of Assistance: Independent Gait Abnormalities: Antalgic Balance:   
Sitting: Intact Standing: Intact Body Structures Involved: 1. Joints Body Functions Affected: 1. Movement Related Activities and Participation Affected: 1. Mobility Number of elements that affect the Plan of Care: 3: MODERATE COMPLEXITY CLINICAL PRESENTATION:  
Presentation: Stable and uncomplicated: LOW COMPLEXITY CLINICAL DECISION MAKING:  
Outcome Measure: Tool Used: Lower Extremity Functional Scale (LEFS) Score:  Initial: 58/80 Most Recent: X/80 (Date: -- ) Interpretation of Score: 20 questions each scored on a 5 point scale with 0 representing \"extreme difficulty or unable to perform\" and 4 representing \"no difficulty\". The lower the score, the greater the functional disability. 80/80 represents no disability. Minimal detectable change is 9 points. Medical Necessity:  
· Mr. Mitchell Simmons is expected to optimize his lower extremity strength and ROM in preparation for joint replacement surgery. Reason for Services/Other Comments: · Achieve baseline assesment of musculoskeletal system, functional mobility and home environment. , educate in PT HEP in preparation for surgery, educate in hospital plan of care. Use of outcome tool(s) and clinical judgement create a POC that gives a: Clear prediction of patient's progress: LOW COMPLEXITY  
TREATMENT:  
Treatment/Session Assessment:  Patient was instructed in PT- HEP to increase strength and ROM in LEs. Answered all questions. · Post session pain:  1/10 · Compliance with Program/Exercises: Will assess as treatment progresses. Total Treatment Duration: PT Patient Time In/Time Out Time In: 0915 Time Out: 0930 Sunil Jacques, PT

## 2019-02-19 NOTE — PERIOP NOTES
Labs dated 2/19/19 routed via [address] [facility] to patients PCP, Dr. Stephanie Nguyen, via fax at [phone] per Dr. Lex Linares request. Lab results also routed via [facility] to ordering physician, Dr. Cece Franco.

## 2019-02-19 NOTE — PROGRESS NOTES
02/19/19 0900 Oxygen Therapy O2 Sat (%) 97 % Pulse via Oximetry 92 beats per minute O2 Device Room air Pre-Treatment Breath Sounds Bilateral Clear;Diminished Pre FEV1 (liters) 2.4 liters % Predicted 66 Incentive Spirometry Treatment Actual Volume (ml) 2500 ml Initial respiratory Assessment completed with pt. Pt was interviewed and evaluated in [facility] prior to surgery. Patient ID: 
Sayra Helton [id number] 
40 y.o. 
1955 Surgeon: Dr. Francisco Nick Date of Surgery: 3/12/2019 Procedure: Total KNEE Arthroplasty Primary Care Physician: Grecia, Not On File None Specialists:  
                    
          Pt instructed in the use of Incentive Spirometry. Pt instructed to bring Incentive Spirometer back on date of surgery & to start using Is upon return to pt room. Pt taught proper cough technique History of smoking:   NONE Quit date:        
 
Secondhand smoke:NONE Past procedures with Oxygen desaturation:DENIES Past Medical History:  
Diagnosis Date  Diabetes ([address] Controlled with medication, last a1c 6.6 in November 2018, patient does not check sugar daily, no problems with hypoglycemia  Hypertension   
 controlled with medication  Hypothyroidism   
 controlled with medication  Osteoarthritis 4/25/2017 Respiratory history:DENIES SOB Respiratory meds:  NA 
 
 
                                
FAMILY PRESENT:           FRIEND, PAST SLEEP STUDY:                     DENIES 
HX OF LINA:                                       DENIES 
                                
 
LINA assessment: SLEEPS ON SIDE PHYSICAL EXAM Body mass index is 31.93 kg/m². Visit Vitals /86 (BP 1 Location: Left arm, BP Patient Position: Sitting) Pulse 77 Temp 98 °F (36.7 °C) Resp 18 Ht 5' 8\" (1.727 m) Wt 95.3 kg (210 lb) SpO2 99% BMI 31.93 kg/m² Neck circumference:  46    cm Loud snoring: SNORES Witnessed apnea or wakening gasping or choking:,             DENIES, Awakens with headaches:                                                  DENIES Morning or daytime tiredness/ sleepiness:                    DENIES - NAPS DAILY FOR 1/2 HOUR Dry mouth or sore throat in morning: DENIES Walter stage:  3 SACS score:35 STOP/BAN 
 
                         
CPAP:                       NONE 
 
 
 
 
 
     CONT SAT HS Referrals: 
 
Pt. Phone Number:

## 2019-02-19 NOTE — PERIOP NOTES
Recent Results (from the past 12 hour(s)) CBC WITH AUTOMATED DIFF Collection Time: 02/19/19  9:20 AM  
Result Value Ref Range WBC 5.4 4.3 - 11.1 K/uL  
 RBC 4.49 4.23 - 5.6 M/uL  
 HGB 13.5 (L) 13.6 - 17.2 g/dL HCT 40.7 (L) 41.1 - 50.3 % MCV 90.6 79.6 - 97.8 FL  
 MCH 30.1 26.1 - 32.9 PG  
 MCHC 33.2 31.4 - 35.0 g/dL  
 RDW 12.6 11.9 - 14.6 % PLATELET 522 997 - 650 K/uL MPV 9.0 (L) 9.4 - 12.3 FL ABSOLUTE NRBC 0.00 0.0 - 0.2 K/uL  
 DF AUTOMATED NEUTROPHILS 66 43 - 78 % LYMPHOCYTES 23 13 - 44 % MONOCYTES 8 4.0 - 12.0 % EOSINOPHILS 2 0.5 - 7.8 % BASOPHILS 0 0.0 - 2.0 % IMMATURE GRANULOCYTES 0 0.0 - 5.0 %  
 ABS. NEUTROPHILS 3.6 1.7 - 8.2 K/UL  
 ABS. LYMPHOCYTES 1.2 0.5 - 4.6 K/UL  
 ABS. MONOCYTES 0.5 0.1 - 1.3 K/UL  
 ABS. EOSINOPHILS 0.1 0.0 - 0.8 K/UL  
 ABS. BASOPHILS 0.0 0.0 - 0.2 K/UL  
 ABS. IMM. GRANS. 0.0 0.0 - 0.5 K/UL METABOLIC PANEL, BASIC Collection Time: 02/19/19  9:20 AM  
Result Value Ref Range Sodium 139 136 - 145 mmol/L Potassium 3.9 3.5 - 5.1 mmol/L Chloride 108 (H) 98 - 107 mmol/L  
 CO2 24 21 - 32 mmol/L Anion gap 7 mmol/L Glucose 207 (H) 65 - 100 mg/dL BUN 9 8 - 23 MG/DL Creatinine 0.90 0.8 - 1.5 MG/DL  
 GFR est AA >60 >60 ml/min/1.73m2 GFR est non-AA >60 ml/min/1.73m2 Calcium 8.7 8.3 - 10.4 MG/DL PROTHROMBIN TIME + INR Collection Time: 02/19/19  9:20 AM  
Result Value Ref Range Prothrombin time 14.2 11.7 - 14.5 sec INR 1.1 PTT Collection Time: 02/19/19  9:20 AM  
Result Value Ref Range aPTT 27.6 24.7 - 39.8 SEC URINALYSIS W/ RFLX MICROSCOPIC Collection Time: 02/19/19  9:20 AM  
Result Value Ref Range Color YELLOW Appearance CLEAR Specific gravity 1.019 1.001 - 1.023    
 pH (UA) 5.5 5.0 - 9.0 Protein NEGATIVE  NEG mg/dL Glucose NEGATIVE  mg/dL Ketone NEGATIVE  NEG mg/dL Bilirubin NEGATIVE  NEG Blood SMALL (A) NEG Urobilinogen 0.2 0.2 - 1.0 EU/dL Nitrites NEGATIVE  NEG Leukocyte Esterase NEGATIVE  NEG    
 WBC 0-3 0 /hpf  
 RBC 20-50 0 /hpf Epithelial cells 0-3 0 /hpf Bacteria 0 0 /hpf Casts 0-3 0 /lpf EKG, 12 LEAD, INITIAL Collection Time: 02/19/19 10:33 AM  
Result Value Ref Range Ventricular Rate 79 BPM  
 Atrial Rate 79 BPM  
 P-R Interval 190 ms QRS Duration 96 ms  
 Q-T Interval 362 ms QTC Calculation (Bezet) 415 ms Calculated P Axis 53 degrees Calculated R Axis 13 degrees Calculated T Axis 44 degrees Diagnosis Normal sinus rhythm Normal ECG When compared with ECG of 03-APR-2017 10:42, No significant change was found

## 2019-02-20 NOTE — ADVANCED PRACTICE NURSE
Total Joint Surgery Preoperative Chart Review Patient ID: 
Emilia Cochran [id number] 
35 y.o. 
1955 Surgeon: Dr. Amaury Fernandez Date of Surgery: 3/12/2019 Procedure: Total Right Knee Arthroplasty Primary Care Physician: Grecia, Not On File None Specialty Physician(s):   
 
Subjective:  
Emilia Cochran is a 70 y.o.  male who presents for preoperative evaluation for Total Left Knee arthroplasty. This is a preoperative chart review note based on data collected by the nurse at the surgical Pre-Assessment visit. Past Medical History:  
Diagnosis Date  Diabetes ([address] Controlled with medication, last a1c 6.6 in November 2018, patient does not check sugar daily, no problems with hypoglycemia  Hypertension   
 controlled with medication  Hypothyroidism   
 controlled with medication  Osteoarthritis 4/25/2017 Past Surgical History:  
Procedure Laterality Date  HX KNEE REPLACEMENT Right 04/25/2017 Family History Problem Relation Age of Onset  Stroke Mother  Heart Disease Father Social History Tobacco Use  Smoking status: Never Smoker  Smokeless tobacco: Never Used Substance Use Topics  Alcohol use: No  
   
Prior to Admission medications Medication Sig Start Date End Date Taking? Authorizing Provider  
losartan (COZAAR) 25 mg tablet Take 25 mg by mouth daily. Yes Provider, Historical  
vit C/E/Zn/coppr/lutein/zeaxan (PRESERVISION AREDS-2 PO) Take  by mouth two (2) times a day. Yes Provider, Historical  
cyanocobalamin, vitamin B-12, (VITAMIN B-12) 5,000 mcg subl by SubLINGual route daily. Yes Provider, Historical  
nicotinic acid (NIACIN) 500 mg tablet Take 500 mg by mouth nightly. Yes Provider, Historical  
aspirin delayed-release 81 mg tablet Take  by mouth nightly. Yes Provider, Historical  
folic acid 634 mcg tablet Take 400 mcg by mouth daily.    Yes Provider, Historical  
 amLODIPine (NORVASC) 5 mg tablet Take 5 mg by mouth nightly. Indications: hypertension   Yes Provider, Historical  
glyBURIDE-metFORMIN (GLUCOVANCE) 1. mg per tablet Take 1 Tab by mouth two (2) times a day. Takes in morning and at 7 pm   Indications: type 2 diabetes mellitus   Yes Provider, Historical  
levothyroxine (SYNTHROID) 88 mcg tablet Take  by mouth Daily (before breakfast). Take / use AM day of surgery  per anesthesia protocols. Indications: hypothyroidism   Yes Provider, Historical  
 
No Known Allergies Objective:  
 
Physical Exam:  
/86 (BP 1 Location: Left arm, BP Patient Position: Sitting) Pulse 77 Temp 98 °F (36.7 °C) Resp 18 Ht 5' 8\" (1.727 m) Wt 95.3 kg (210 lb) SpO2 99% BMI 31.93 kg/m²  
  
Neck circumference:  46    cm 
 
ECG:   
EKG Results Procedure 720 Value Units Date/Time EKG, 12 LEAD, INITIAL [[id number]] Collected:  02/19/19 1033 Order Status:  Completed Updated:  02/19/19 1234 Ventricular Rate 79 BPM   
  Atrial Rate 79 BPM   
  P-R Interval 190 ms QRS Duration 96 ms   
  Q-T Interval 362 ms QTC Calculation (Bezet) 415 ms Calculated P Axis 53 degrees Calculated R Axis 13 degrees Calculated T Axis 44 degrees Diagnosis --  
  Normal sinus rhythm Normal ECG When compared with ECG of 03-APR-2017 10:42, No significant change was found Confirmed by Pascual Noriega ([id number]) on 2/19/2019 12:34:22 PM 
  
  
 
 
Data Review:  
Labs:  
Results for Rolf Pereira (MRN [id number]) as of 2/20/2019 12:25 Ref. Range 2/19/2019 09:20 Sodium Latest Ref Range: 136 - 145 mmol/L 139 Potassium Latest Ref Range: 3.5 - 5.1 mmol/L 3.9 Chloride Latest Ref Range: 98 - 107 mmol/L 108 (H) CO2 Latest Ref Range: 21 - 32 mmol/L 24 Anion gap Latest Units: mmol/L 7 Glucose Latest Ref Range: 65 - 100 mg/dL 207 (H) BUN Latest Ref Range: 8 - 23 MG/DL 9 Creatinine Latest Ref Range: 0.8 - 1.5 MG/DL 0.90 Calcium Latest Ref Range: 8.3 - 10.4 MG/DL 8.7 GFR est non-AA Latest Units: ml/min/1.73m2 >60  
GFR est AA Latest Ref Range: >60 ml/min/1.73m2 >60 Problem List: 
) Patient Active Problem List  
Diagnosis Code  Osteoarthritis M19.90  
 S/P total knee arthroplasty C87.371 Total Joint Surgery Pre-Assessment Recommendations:     
He/she is a moderate risk for sleep apnea but is not interested in additional work up at this time. Will initiate perioperative LINA precautions. Recommend continuous saturation monitoring hours of sleep, during hospitalization. Signed By: Veag Blanco NP-C February 20, 2019

## 2019-03-11 ENCOUNTER — ANESTHESIA EVENT (OUTPATIENT)
Dept: SURGERY | Age: 64
DRG: 470 | End: 2019-03-11
Payer: MEDICARE

## 2019-03-11 NOTE — H&P
H&P    Patient ID:  Joyce Chandler  [id number]  55 y.o.  1955  Surgeon:  Angelo León MD  Date of Surgery: * No surgery date entered *  Procedure: Left Knee Total Arthroplasty  Primary Care Physician: Grecia, Not On File        Subjective:  Joyce Chandler is a 70 y.o.  male who presents with Left Knee pain. He has history of Left Knee pain for several months ago. Symptoms worse with walking and relieved with rest. Conservative treatment consisting of  therapeutic injections into the knee has not helped. The patient  lives with their spouse. The patients goal after surgery is improved pain and function. Past Medical History:   Diagnosis Date    Diabetes ([address]     Controlled with medication, last a1c 6.6 in November 2018, patient does not check sugar daily, no problems with hypoglycemia     Hypertension     controlled with medication     Hypothyroidism     controlled with medication     Osteoarthritis 4/25/2017      Past Surgical History:   Procedure Laterality Date    HX KNEE REPLACEMENT Right 04/25/2017     Family History   Problem Relation Age of Onset    Stroke Mother     Heart Disease Father       Social History     Tobacco Use    Smoking status: Never Smoker    Smokeless tobacco: Never Used   Substance Use Topics    Alcohol use: No       Prior to Admission medications    Medication Sig Start Date End Date Taking? Authorizing Provider   losartan (COZAAR) 25 mg tablet Take 25 mg by mouth daily. Provider, Historical   vit C/E/Zn/coppr/lutein/zeaxan (PRESERVISION AREDS-2 PO) Take  by mouth two (2) times a day. Provider, Historical   cyanocobalamin, vitamin B-12, (VITAMIN B-12) 5,000 mcg subl by SubLINGual route daily. Provider, Historical   nicotinic acid (NIACIN) 500 mg tablet Take 500 mg by mouth nightly. Provider, Historical   aspirin delayed-release 81 mg tablet Take  by mouth nightly. Provider, Historical   folic acid 642 mcg tablet Take 400 mcg by mouth daily. Provider, Historical   amLODIPine (NORVASC) 5 mg tablet Take 5 mg by mouth nightly. Indications: hypertension    Provider, Historical   glyBURIDE-metFORMIN (GLUCOVANCE) 1. mg per tablet Take 1 Tab by mouth two (2) times a day. Takes in morning and at 7 pm   Indications: type 2 diabetes mellitus    Provider, Historical   levothyroxine (SYNTHROID) 88 mcg tablet Take  by mouth Daily (before breakfast). Take / use AM day of surgery  per anesthesia protocols. Indications: hypothyroidism    Provider, Historical     No Known Allergies     REVIEW OF SYSTEMS:  CONSTITUTIONAL: Denies fever, decreased appetite, weight loss/gain, night sweats or fatigue. HEENT: Denies vision or hearing changes. has glasses. denies hearing aids. CARDIAC: Denies CP, palpitations, rheumatic fever, murmur, peripheral edema, carotid artery disease or syncopal episodes. RESPIRATORY: Denies dyspnea on exertion, asthma, COPD or orthopnea. GI: Denies GERD, history of GI bleed or melena, PUD, hepatitis or cirrhosis. : Denies dysuria, hematuria. denies BPH symptoms. HEMATOLOGIC: Denies anemia or blood disorders. ENDOCRINE: Denies thyroid disease. MUSCULOSKELETAL: See HPI. NEUROLOGIC: Denies seizure, peripheral neuropathy or memory loss. PSYCH: Denies depression, anxiety or insomnia. SKIN: Denies rash or open sores. Objective:    PHYSICAL EXAM  GENERAL:   Patient Vitals for the past 8 hrs:   Height Weight   03/11/19 0557 5' 8\" (1.727 m) 95.3 kg (209 lb 16 oz)    EYES: PERRL. EOM intact. MOUTH:Teeth and Gums normal. NECK: Full ROM. Trachea midline. No thyromegaly or JVD. CARDIOVASCULAR: Regular rate and rhythm. No murmur or gallops. No carotid bruits. Peripheral pulses: radial 2 +, PT 2+, DP 2+ bilaterally. LUNGS: CTA bilaterally. No wheezes, rhonchi or rales. GI: positive BS. Abdomen nontender. NEUROLOGIC: Alert and oriented x 3. Bilateral equal strong had grasp and bilateral equal strong plantar flexion and dorsiflexion.  GAIT: abnormal MUSCULOSKELETAL: ROM: full range with pain. Tenderness: Medial joint line. Crepitus: present. SKIN: No rash, bruising, swelling, redness or warmth. Labs:  No results found for this or any previous visit (from the past 24 hour(s)). Xray Left Knee: Joint space narrowing    Assessment:  Advanced Left Knee Osteoarthritis. Total Left Knee Arthroplasty Indicated. Patient Active Problem List   Diagnosis Code    Osteoarthritis M19.90    S/P total knee arthroplasty Z96.659       Plan:  I have advised the patient of the risks and consequences, including possible complications of performing total joint replacement, as well as not doing this operation. The patient had the opportunity to ask questions and have them answered to their satisfaction.      Signed:  VANESSA Cartagena 3/11/2019

## 2019-03-12 ENCOUNTER — HOSPITAL ENCOUNTER (INPATIENT)
Age: 64
LOS: 1 days | Discharge: HOME HEALTH | DRG: 470 | End: 2019-03-13
Attending: ORTHOPAEDIC SURGERY | Admitting: ORTHOPAEDIC SURGERY
Payer: MEDICARE

## 2019-03-12 ENCOUNTER — HOME HEALTH ADMISSION (OUTPATIENT)
Dept: HOME HEALTH SERVICES | Facility: HOME HEALTH | Age: 64
End: 2019-03-12
Payer: MEDICARE

## 2019-03-12 ENCOUNTER — ANESTHESIA (OUTPATIENT)
Dept: SURGERY | Age: 64
DRG: 470 | End: 2019-03-12
Payer: MEDICARE

## 2019-03-12 DIAGNOSIS — Z96.652 TOTAL KNEE REPLACEMENT STATUS, LEFT: Primary | ICD-10-CM

## 2019-03-12 PROBLEM — M17.12 ARTHRITIS OF KNEE, LEFT: Status: ACTIVE | Noted: 2019-03-12

## 2019-03-12 PROBLEM — E11.9 DIABETES MELLITUS TYPE 2, CONTROLLED (HCC): Status: ACTIVE | Noted: 2019-03-12

## 2019-03-12 PROBLEM — I10 HYPERTENSION: Status: ACTIVE | Noted: 2019-03-12

## 2019-03-12 PROBLEM — E78.5 HYPERLIPIDEMIA: Status: ACTIVE | Noted: 2019-03-12

## 2019-03-12 LAB
ABO + RH BLD: NORMAL
BLOOD GROUP ANTIBODIES SERPL: NORMAL
GLUCOSE BLD STRIP.AUTO-MCNC: 132 MG/DL (ref 65–100)
GLUCOSE BLD STRIP.AUTO-MCNC: 275 MG/DL (ref 65–100)
HGB BLD-MCNC: 13.1 G/DL (ref 13.6–17.2)
SPECIMEN EXP DATE BLD: NORMAL

## 2019-03-12 PROCEDURE — 77030013819 HC MX SYS CEM ZIMM -B: Performed by: ORTHOPAEDIC SURGERY

## 2019-03-12 PROCEDURE — 74011250636 HC RX REV CODE- 250/636: Performed by: ANESTHESIOLOGY

## 2019-03-12 PROCEDURE — 77030006835 HC BLD SAW SAG STRY -B: Performed by: ORTHOPAEDIC SURGERY

## 2019-03-12 PROCEDURE — 77030018673: Performed by: ORTHOPAEDIC SURGERY

## 2019-03-12 PROCEDURE — 77030020256 HC SOL INJ NACL 0.9%  500ML: Performed by: ORTHOPAEDIC SURGERY

## 2019-03-12 PROCEDURE — 85018 HEMOGLOBIN: CPT

## 2019-03-12 PROCEDURE — 94760 N-INVAS EAR/PLS OXIMETRY 1: CPT

## 2019-03-12 PROCEDURE — 77030020263 HC SOL INJ SOD CL0.9% LFCR 1000ML

## 2019-03-12 PROCEDURE — 0SRD0J9 REPLACEMENT OF LEFT KNEE JOINT WITH SYNTHETIC SUBSTITUTE, CEMENTED, OPEN APPROACH: ICD-10-PCS | Performed by: ORTHOPAEDIC SURGERY

## 2019-03-12 PROCEDURE — 86580 TB INTRADERMAL TEST: CPT | Performed by: ORTHOPAEDIC SURGERY

## 2019-03-12 PROCEDURE — 74011000258 HC RX REV CODE- 258: Performed by: ORTHOPAEDIC SURGERY

## 2019-03-12 PROCEDURE — 82962 GLUCOSE BLOOD TEST: CPT

## 2019-03-12 PROCEDURE — 77030020782 HC GWN BAIR PAWS FLX 3M -B: Performed by: NURSE ANESTHETIST, CERTIFIED REGISTERED

## 2019-03-12 PROCEDURE — C1776 JOINT DEVICE (IMPLANTABLE): HCPCS | Performed by: ORTHOPAEDIC SURGERY

## 2019-03-12 PROCEDURE — 76060000034 HC ANESTHESIA 1.5 TO 2 HR: Performed by: ORTHOPAEDIC SURGERY

## 2019-03-12 PROCEDURE — 74011250636 HC RX REV CODE- 250/636: Performed by: ORTHOPAEDIC SURGERY

## 2019-03-12 PROCEDURE — 74011250637 HC RX REV CODE- 250/637: Performed by: ORTHOPAEDIC SURGERY

## 2019-03-12 PROCEDURE — 77030018836 HC SOL IRR NACL ICUM -A: Performed by: ORTHOPAEDIC SURGERY

## 2019-03-12 PROCEDURE — 77030006720 HC BLD PAT RMR ZIMM -B: Performed by: ORTHOPAEDIC SURGERY

## 2019-03-12 PROCEDURE — 74011250636 HC RX REV CODE- 250/636

## 2019-03-12 PROCEDURE — 76010000162 HC OR TIME 1.5 TO 2 HR INTENSV-TIER 1: Performed by: ORTHOPAEDIC SURGERY

## 2019-03-12 PROCEDURE — 77030033067 HC SUT PDO STRATFX SPIR J&J -B: Performed by: ORTHOPAEDIC SURGERY

## 2019-03-12 PROCEDURE — 77030012935 HC DRSG AQUACEL BMS -B: Performed by: ORTHOPAEDIC SURGERY

## 2019-03-12 PROCEDURE — 77030003665 HC NDL SPN BBMI -A: Performed by: NURSE ANESTHETIST, CERTIFIED REGISTERED

## 2019-03-12 PROCEDURE — 97161 PT EVAL LOW COMPLEX 20 MIN: CPT

## 2019-03-12 PROCEDURE — 74011000302 HC RX REV CODE- 302: Performed by: ORTHOPAEDIC SURGERY

## 2019-03-12 PROCEDURE — 86900 BLOOD TYPING SEROLOGIC ABO: CPT

## 2019-03-12 PROCEDURE — 74011250637 HC RX REV CODE- 250/637: Performed by: ANESTHESIOLOGY

## 2019-03-12 PROCEDURE — 77030008467 HC STPLR SKN COVD -B: Performed by: ORTHOPAEDIC SURGERY

## 2019-03-12 PROCEDURE — 76210000016 HC OR PH I REC 1 TO 1.5 HR: Performed by: ORTHOPAEDIC SURGERY

## 2019-03-12 PROCEDURE — C1713 ANCHOR/SCREW BN/BN,TIS/BN: HCPCS | Performed by: ORTHOPAEDIC SURGERY

## 2019-03-12 PROCEDURE — 94762 N-INVAS EAR/PLS OXIMTRY CONT: CPT

## 2019-03-12 PROCEDURE — 74011000250 HC RX REV CODE- 250: Performed by: ORTHOPAEDIC SURGERY

## 2019-03-12 PROCEDURE — 77030003602 HC NDL NRV BLK BBMI -B: Performed by: NURSE ANESTHETIST, CERTIFIED REGISTERED

## 2019-03-12 PROCEDURE — 74011636637 HC RX REV CODE- 636/637: Performed by: INTERNAL MEDICINE

## 2019-03-12 PROCEDURE — 77030019557 HC ELECTRD VES SEAL MEDT -F: Performed by: ORTHOPAEDIC SURGERY

## 2019-03-12 PROCEDURE — 77030011208: Performed by: ORTHOPAEDIC SURGERY

## 2019-03-12 PROCEDURE — 97110 THERAPEUTIC EXERCISES: CPT

## 2019-03-12 PROCEDURE — 74011000250 HC RX REV CODE- 250

## 2019-03-12 PROCEDURE — 77030016544 HC BLD SAW RECIP1 STRY -B: Performed by: ORTHOPAEDIC SURGERY

## 2019-03-12 PROCEDURE — 36415 COLL VENOUS BLD VENIPUNCTURE: CPT

## 2019-03-12 PROCEDURE — 77030007880 HC KT SPN EPDRL BBMI -B: Performed by: NURSE ANESTHETIST, CERTIFIED REGISTERED

## 2019-03-12 PROCEDURE — 77030037623 HC FEM TRIAL PK ATTUNE INTTN J&J -D: Performed by: ORTHOPAEDIC SURGERY

## 2019-03-12 PROCEDURE — 77030034849: Performed by: ORTHOPAEDIC SURGERY

## 2019-03-12 PROCEDURE — 97165 OT EVAL LOW COMPLEX 30 MIN: CPT

## 2019-03-12 PROCEDURE — 77030032490 HC SLV COMPR SCD KNE COVD -B

## 2019-03-12 PROCEDURE — 77030031139 HC SUT VCRL2 J&J -A: Performed by: ORTHOPAEDIC SURGERY

## 2019-03-12 PROCEDURE — 65270000029 HC RM PRIVATE

## 2019-03-12 PROCEDURE — 77030013708 HC HNDPC SUC IRR PULS STRY –B: Performed by: ORTHOPAEDIC SURGERY

## 2019-03-12 PROCEDURE — 77030036688 HC BLNKT CLD THER S2SG -B

## 2019-03-12 DEVICE — (D)CEMENT BNE HV R 40GM -- DUPE USE ITEM 353850: Type: IMPLANTABLE DEVICE | Site: KNEE | Status: FUNCTIONAL

## 2019-03-12 DEVICE — INSERT TIB SZ 8 THK7MM KNEE POST STBL ROT PLATFRM ATTUNE: Type: IMPLANTABLE DEVICE | Site: KNEE | Status: FUNCTIONAL

## 2019-03-12 DEVICE — COMPONENT PAT DIA41MM POLYETH DOME CEM MEDIALIZED ATTUNE: Type: IMPLANTABLE DEVICE | Site: KNEE | Status: FUNCTIONAL

## 2019-03-12 DEVICE — COMPONENT FEM SZ 8 L KNEE POST STBL CEM ATTUNE: Type: IMPLANTABLE DEVICE | Site: KNEE | Status: FUNCTIONAL

## 2019-03-12 RX ORDER — ONDANSETRON 2 MG/ML
INJECTION INTRAMUSCULAR; INTRAVENOUS AS NEEDED
Status: DISCONTINUED | OUTPATIENT
Start: 2019-03-12 | End: 2019-03-12 | Stop reason: HOSPADM

## 2019-03-12 RX ORDER — ACETAMINOPHEN 500 MG
1000 TABLET ORAL EVERY 6 HOURS
Status: DISCONTINUED | OUTPATIENT
Start: 2019-03-13 | End: 2019-03-13 | Stop reason: HOSPADM

## 2019-03-12 RX ORDER — FAMOTIDINE 20 MG/1
20 TABLET, FILM COATED ORAL ONCE
Status: COMPLETED | OUTPATIENT
Start: 2019-03-12 | End: 2019-03-12

## 2019-03-12 RX ORDER — CELECOXIB 200 MG/1
200 CAPSULE ORAL EVERY 12 HOURS
Status: DISCONTINUED | OUTPATIENT
Start: 2019-03-12 | End: 2019-03-13 | Stop reason: HOSPADM

## 2019-03-12 RX ORDER — HYDROCODONE BITARTRATE AND ACETAMINOPHEN 5; 325 MG/1; MG/1
1 TABLET ORAL AS NEEDED
Status: DISCONTINUED | OUTPATIENT
Start: 2019-03-12 | End: 2019-03-12 | Stop reason: HOSPADM

## 2019-03-12 RX ORDER — ONDANSETRON 8 MG/1
8 TABLET, ORALLY DISINTEGRATING ORAL
Status: DISCONTINUED | OUTPATIENT
Start: 2019-03-12 | End: 2019-03-13 | Stop reason: HOSPADM

## 2019-03-12 RX ORDER — OXYCODONE HYDROCHLORIDE 5 MG/1
5-10 TABLET ORAL
Status: DISCONTINUED | OUTPATIENT
Start: 2019-03-12 | End: 2019-03-13 | Stop reason: HOSPADM

## 2019-03-12 RX ORDER — AMOXICILLIN 250 MG
2 CAPSULE ORAL DAILY
Status: DISCONTINUED | OUTPATIENT
Start: 2019-03-13 | End: 2019-03-13 | Stop reason: HOSPADM

## 2019-03-12 RX ORDER — AMLODIPINE BESYLATE 5 MG/1
5 TABLET ORAL
Status: DISCONTINUED | OUTPATIENT
Start: 2019-03-12 | End: 2019-03-13 | Stop reason: HOSPADM

## 2019-03-12 RX ORDER — MIDAZOLAM HYDROCHLORIDE 1 MG/ML
INJECTION, SOLUTION INTRAMUSCULAR; INTRAVENOUS AS NEEDED
Status: DISCONTINUED | OUTPATIENT
Start: 2019-03-12 | End: 2019-03-12 | Stop reason: HOSPADM

## 2019-03-12 RX ORDER — BUPIVACAINE HYDROCHLORIDE 7.5 MG/ML
INJECTION, SOLUTION INTRASPINAL
Status: DISCONTINUED | OUTPATIENT
Start: 2019-03-12 | End: 2019-03-12 | Stop reason: HOSPADM

## 2019-03-12 RX ORDER — DEXAMETHASONE SODIUM PHOSPHATE 100 MG/10ML
INJECTION INTRAMUSCULAR; INTRAVENOUS AS NEEDED
Status: DISCONTINUED | OUTPATIENT
Start: 2019-03-12 | End: 2019-03-12 | Stop reason: HOSPADM

## 2019-03-12 RX ORDER — LORATADINE 10 MG/1
10 TABLET ORAL
Status: DISCONTINUED | OUTPATIENT
Start: 2019-03-12 | End: 2019-03-13 | Stop reason: HOSPADM

## 2019-03-12 RX ORDER — MIDAZOLAM HYDROCHLORIDE 1 MG/ML
2 INJECTION, SOLUTION INTRAMUSCULAR; INTRAVENOUS
Status: COMPLETED | OUTPATIENT
Start: 2019-03-12 | End: 2019-03-12

## 2019-03-12 RX ORDER — NALOXONE HYDROCHLORIDE 0.4 MG/ML
.2-.4 INJECTION, SOLUTION INTRAMUSCULAR; INTRAVENOUS; SUBCUTANEOUS
Status: DISCONTINUED | OUTPATIENT
Start: 2019-03-12 | End: 2019-03-13 | Stop reason: HOSPADM

## 2019-03-12 RX ORDER — FENTANYL CITRATE 50 UG/ML
100 INJECTION, SOLUTION INTRAMUSCULAR; INTRAVENOUS AS NEEDED
Status: DISCONTINUED | OUTPATIENT
Start: 2019-03-12 | End: 2019-03-12 | Stop reason: HOSPADM

## 2019-03-12 RX ORDER — SODIUM CHLORIDE 0.9 % (FLUSH) 0.9 %
5-40 SYRINGE (ML) INJECTION EVERY 8 HOURS
Status: DISCONTINUED | OUTPATIENT
Start: 2019-03-12 | End: 2019-03-13 | Stop reason: HOSPADM

## 2019-03-12 RX ORDER — CEFAZOLIN SODIUM/WATER 2 G/20 ML
2 SYRINGE (ML) INTRAVENOUS ONCE
Status: COMPLETED | OUTPATIENT
Start: 2019-03-12 | End: 2019-03-12

## 2019-03-12 RX ORDER — SODIUM CHLORIDE, SODIUM LACTATE, POTASSIUM CHLORIDE, CALCIUM CHLORIDE 600; 310; 30; 20 MG/100ML; MG/100ML; MG/100ML; MG/100ML
150 INJECTION, SOLUTION INTRAVENOUS CONTINUOUS
Status: DISCONTINUED | OUTPATIENT
Start: 2019-03-12 | End: 2019-03-12 | Stop reason: HOSPADM

## 2019-03-12 RX ORDER — CEFAZOLIN SODIUM/WATER 2 G/20 ML
2 SYRINGE (ML) INTRAVENOUS EVERY 8 HOURS
Status: COMPLETED | OUTPATIENT
Start: 2019-03-12 | End: 2019-03-13

## 2019-03-12 RX ORDER — ACETAMINOPHEN 10 MG/ML
1000 INJECTION, SOLUTION INTRAVENOUS ONCE
Status: COMPLETED | OUTPATIENT
Start: 2019-03-12 | End: 2019-03-12

## 2019-03-12 RX ORDER — CELECOXIB 200 MG/1
200 CAPSULE ORAL
Status: COMPLETED | OUTPATIENT
Start: 2019-03-12 | End: 2019-03-12

## 2019-03-12 RX ORDER — PROPOFOL 10 MG/ML
INJECTION, EMULSION INTRAVENOUS
Status: DISCONTINUED | OUTPATIENT
Start: 2019-03-12 | End: 2019-03-12 | Stop reason: HOSPADM

## 2019-03-12 RX ORDER — SODIUM CHLORIDE 9 MG/ML
50 INJECTION, SOLUTION INTRAVENOUS CONTINUOUS
Status: DISCONTINUED | OUTPATIENT
Start: 2019-03-12 | End: 2019-03-12 | Stop reason: HOSPADM

## 2019-03-12 RX ORDER — HYDROMORPHONE HYDROCHLORIDE 2 MG/ML
1 INJECTION, SOLUTION INTRAMUSCULAR; INTRAVENOUS; SUBCUTANEOUS
Status: DISCONTINUED | OUTPATIENT
Start: 2019-03-12 | End: 2019-03-13 | Stop reason: HOSPADM

## 2019-03-12 RX ORDER — SODIUM CHLORIDE, SODIUM LACTATE, POTASSIUM CHLORIDE, CALCIUM CHLORIDE 600; 310; 30; 20 MG/100ML; MG/100ML; MG/100ML; MG/100ML
125 INJECTION, SOLUTION INTRAVENOUS CONTINUOUS
Status: DISCONTINUED | OUTPATIENT
Start: 2019-03-12 | End: 2019-03-12 | Stop reason: HOSPADM

## 2019-03-12 RX ORDER — ACETAMINOPHEN 500 MG
1000 TABLET ORAL
Status: DISCONTINUED | OUTPATIENT
Start: 2019-03-12 | End: 2019-03-12 | Stop reason: HOSPADM

## 2019-03-12 RX ORDER — LOSARTAN POTASSIUM 25 MG/1
25 TABLET ORAL DAILY
Status: DISCONTINUED | OUTPATIENT
Start: 2019-03-13 | End: 2019-03-13 | Stop reason: HOSPADM

## 2019-03-12 RX ORDER — KETOROLAC TROMETHAMINE 30 MG/ML
INJECTION, SOLUTION INTRAMUSCULAR; INTRAVENOUS AS NEEDED
Status: DISCONTINUED | OUTPATIENT
Start: 2019-03-12 | End: 2019-03-12 | Stop reason: HOSPADM

## 2019-03-12 RX ORDER — LEVOTHYROXINE SODIUM 88 UG/1
88 TABLET ORAL
Status: DISCONTINUED | OUTPATIENT
Start: 2019-03-13 | End: 2019-03-13 | Stop reason: HOSPADM

## 2019-03-12 RX ORDER — PROMETHAZINE HYDROCHLORIDE 25 MG/1
25 TABLET ORAL
Status: DISCONTINUED | OUTPATIENT
Start: 2019-03-12 | End: 2019-03-13 | Stop reason: HOSPADM

## 2019-03-12 RX ORDER — DIPHENHYDRAMINE HCL 25 MG
25 CAPSULE ORAL
Status: DISCONTINUED | OUTPATIENT
Start: 2019-03-12 | End: 2019-03-13 | Stop reason: HOSPADM

## 2019-03-12 RX ORDER — INSULIN LISPRO 100 [IU]/ML
INJECTION, SOLUTION INTRAVENOUS; SUBCUTANEOUS
Status: DISCONTINUED | OUTPATIENT
Start: 2019-03-12 | End: 2019-03-13 | Stop reason: HOSPADM

## 2019-03-12 RX ORDER — SODIUM CHLORIDE 9 MG/ML
125 INJECTION, SOLUTION INTRAVENOUS CONTINUOUS
Status: DISCONTINUED | OUTPATIENT
Start: 2019-03-12 | End: 2019-03-13 | Stop reason: HOSPADM

## 2019-03-12 RX ORDER — PROPOFOL 10 MG/ML
INJECTION, EMULSION INTRAVENOUS AS NEEDED
Status: DISCONTINUED | OUTPATIENT
Start: 2019-03-12 | End: 2019-03-12 | Stop reason: HOSPADM

## 2019-03-12 RX ORDER — ROPIVACAINE HYDROCHLORIDE 2 MG/ML
INJECTION, SOLUTION EPIDURAL; INFILTRATION; PERINEURAL AS NEEDED
Status: DISCONTINUED | OUTPATIENT
Start: 2019-03-12 | End: 2019-03-12 | Stop reason: HOSPADM

## 2019-03-12 RX ORDER — ZOLPIDEM TARTRATE 5 MG/1
5 TABLET ORAL
Status: DISCONTINUED | OUTPATIENT
Start: 2019-03-12 | End: 2019-03-13 | Stop reason: HOSPADM

## 2019-03-12 RX ORDER — LIDOCAINE HYDROCHLORIDE 10 MG/ML
0.1 INJECTION INFILTRATION; PERINEURAL AS NEEDED
Status: DISCONTINUED | OUTPATIENT
Start: 2019-03-12 | End: 2019-03-12 | Stop reason: HOSPADM

## 2019-03-12 RX ORDER — SODIUM CHLORIDE 0.9 % (FLUSH) 0.9 %
5-40 SYRINGE (ML) INJECTION AS NEEDED
Status: DISCONTINUED | OUTPATIENT
Start: 2019-03-12 | End: 2019-03-13 | Stop reason: HOSPADM

## 2019-03-12 RX ORDER — DEXAMETHASONE SODIUM PHOSPHATE 100 MG/10ML
10 INJECTION INTRAMUSCULAR; INTRAVENOUS ONCE
Status: DISCONTINUED | OUTPATIENT
Start: 2019-03-13 | End: 2019-03-13 | Stop reason: HOSPADM

## 2019-03-12 RX ORDER — HYDROMORPHONE HYDROCHLORIDE 2 MG/ML
0.5 INJECTION, SOLUTION INTRAMUSCULAR; INTRAVENOUS; SUBCUTANEOUS
Status: DISCONTINUED | OUTPATIENT
Start: 2019-03-12 | End: 2019-03-12 | Stop reason: HOSPADM

## 2019-03-12 RX ORDER — MIDAZOLAM HYDROCHLORIDE 1 MG/ML
5 INJECTION, SOLUTION INTRAMUSCULAR; INTRAVENOUS AS NEEDED
Status: DISCONTINUED | OUTPATIENT
Start: 2019-03-12 | End: 2019-03-12 | Stop reason: HOSPADM

## 2019-03-12 RX ORDER — TRANEXAMIC ACID 100 MG/ML
INJECTION, SOLUTION INTRAVENOUS AS NEEDED
Status: DISCONTINUED | OUTPATIENT
Start: 2019-03-12 | End: 2019-03-12 | Stop reason: HOSPADM

## 2019-03-12 RX ORDER — ASPIRIN 81 MG/1
81 TABLET ORAL EVERY 12 HOURS
Status: DISCONTINUED | OUTPATIENT
Start: 2019-03-12 | End: 2019-03-13 | Stop reason: HOSPADM

## 2019-03-12 RX ADMIN — PROPOFOL 75 MCG/KG/MIN: 10 INJECTION, EMULSION INTRAVENOUS at 10:38

## 2019-03-12 RX ADMIN — DEXAMETHASONE SODIUM PHOSPHATE 5 MG: 100 INJECTION INTRAMUSCULAR; INTRAVENOUS at 10:43

## 2019-03-12 RX ADMIN — INSULIN LISPRO 6 UNITS: 100 INJECTION, SOLUTION INTRAVENOUS; SUBCUTANEOUS at 21:03

## 2019-03-12 RX ADMIN — SODIUM CHLORIDE, SODIUM LACTATE, POTASSIUM CHLORIDE, AND CALCIUM CHLORIDE: 600; 310; 30; 20 INJECTION, SOLUTION INTRAVENOUS at 10:30

## 2019-03-12 RX ADMIN — PROPOFOL 30 MG: 10 INJECTION, EMULSION INTRAVENOUS at 10:38

## 2019-03-12 RX ADMIN — Medication 3 AMPULE: at 08:41

## 2019-03-12 RX ADMIN — Medication 2 G: at 17:33

## 2019-03-12 RX ADMIN — TRANEXAMIC ACID 1 G: 100 INJECTION, SOLUTION INTRAVENOUS at 10:25

## 2019-03-12 RX ADMIN — MIDAZOLAM HYDROCHLORIDE 0.5 MG: 1 INJECTION, SOLUTION INTRAMUSCULAR; INTRAVENOUS at 10:31

## 2019-03-12 RX ADMIN — LIDOCAINE HYDROCHLORIDE 0.1 ML: 10 INJECTION, SOLUTION INFILTRATION; PERINEURAL at 08:33

## 2019-03-12 RX ADMIN — MIDAZOLAM HYDROCHLORIDE 0.5 MG: 1 INJECTION, SOLUTION INTRAMUSCULAR; INTRAVENOUS at 11:01

## 2019-03-12 RX ADMIN — MIDAZOLAM 2 MG: 1 INJECTION INTRAMUSCULAR; INTRAVENOUS at 10:32

## 2019-03-12 RX ADMIN — Medication 2 G: at 10:24

## 2019-03-12 RX ADMIN — AMLODIPINE BESYLATE 5 MG: 5 TABLET ORAL at 21:02

## 2019-03-12 RX ADMIN — ASPIRIN 81 MG: 81 TABLET, COATED ORAL at 21:02

## 2019-03-12 RX ADMIN — FAMOTIDINE 20 MG: 20 TABLET, FILM COATED ORAL at 08:41

## 2019-03-12 RX ADMIN — ACETAMINOPHEN 1000 MG: 10 INJECTION, SOLUTION INTRAVENOUS at 17:37

## 2019-03-12 RX ADMIN — BUPIVACAINE HYDROCHLORIDE 12 MG: 7.5 INJECTION, SOLUTION INTRASPINAL at 10:35

## 2019-03-12 RX ADMIN — SODIUM CHLORIDE, SODIUM LACTATE, POTASSIUM CHLORIDE, AND CALCIUM CHLORIDE 500 ML: 600; 310; 30; 20 INJECTION, SOLUTION INTRAVENOUS at 08:34

## 2019-03-12 RX ADMIN — ONDANSETRON 4 MG: 2 INJECTION INTRAMUSCULAR; INTRAVENOUS at 10:44

## 2019-03-12 RX ADMIN — METFORMIN HYDROCHLORIDE: 500 TABLET, FILM COATED ORAL at 17:33

## 2019-03-12 RX ADMIN — FENTANYL CITRATE 100 MCG: 50 INJECTION INTRAMUSCULAR; INTRAVENOUS at 10:32

## 2019-03-12 RX ADMIN — SODIUM CHLORIDE, SODIUM LACTATE, POTASSIUM CHLORIDE, AND CALCIUM CHLORIDE: 600; 310; 30; 20 INJECTION, SOLUTION INTRAVENOUS at 08:25

## 2019-03-12 RX ADMIN — CELECOXIB 200 MG: 200 CAPSULE ORAL at 21:02

## 2019-03-12 RX ADMIN — CELECOXIB 200 MG: 200 CAPSULE ORAL at 08:41

## 2019-03-12 RX ADMIN — Medication 1 AMPULE: at 21:02

## 2019-03-12 RX ADMIN — MIDAZOLAM HYDROCHLORIDE 1 MG: 1 INJECTION, SOLUTION INTRAMUSCULAR; INTRAVENOUS at 10:24

## 2019-03-12 RX ADMIN — TUBERCULIN PURIFIED PROTEIN DERIVATIVE 5 UNITS: 5 INJECTION, SOLUTION INTRADERMAL at 08:33

## 2019-03-12 NOTE — OP NOTES
[facility]  Cemented Total Knee Arthroplasty  Chandrakant Jensen   : 1955  Medical Record [id number]  Pre-operative Diagnosis:  Unilateral primary osteoarthritis, left knee [M17.12]  Post-operative Diagnosis: Unilateral primary osteoarthritis, left knee [M17.12]    Surgeon: Christine Castellanos MD  Assistant: Mirtha Leon PA-C    Anesthesia: Spinal    Procedure: Total Knee Arthroplasty with use of Bone Cement  The complexity of the total joint surgery requires the use of a first assistant for positioning, retraction and assistance in closure. The patient's Body mass index is 31.93 kg/m²., BMI's greater then 40 make surgical exposure and retraction extremely difficult and increase operative time. Tourniquet Time: none  EBL: 150cc  Additional Findings: Severe DJD  Releases none    Myke Moore was brought to the operating room and positioned on the operating table. He was anethestized  A hagan catheter was placed preoperatively and IV antibiotics was administered. Prior to the incision being made a timeout was called identifying the patient, procedure ,operative side and surgeon. . The left leg was prepped and draped in the usual sterile manner  An anterior longitudinal incision was accomplished just medial to the tibial tubercle and extending approximal 6 centimeters proximal to the superior pole of the patella. A medial parapatellar capsular incision was performed. The medial capsular flap was elevated around to the insertion of the semimembranous tendon. The patella was everted and the knee flexed and externally rotated. The medial and external menisci were excised. The lateral half of the fat pad excised and the patella femoral ligament was released. The anterior cruciate ligament was resected and the posterior cruciate ligament was substituted. Using extramedullary instrumentation, the tibial cut was accomplished with appropriate posterior slope.   Approxiamately 2 mm of bone was removed from the low side of the tibia. The distal femur was next addressed. A drill hole was made above the intracondylar notch. Using appropriate intramedullary instrumentation,a 6 degree valgus distal cut was accomplished. A femur was sized. The anterior and posterior cuts were then made about the distal femur. The osteophytes were removed from the tibial and femoral surfaces. The flexion and extension gaps were assessed with the appropriate spacer blocks. Additional surgical procedures included none. The flexion and extension gaps were deemed appropriately balanced. The appropriate cutting blocks were then utilized to perform the anterior chamfer, posterior chamfer and notch cuts, with appropriate lateral tranlation accomplished for the patellofemoral groove. The tibia was sized. The tibial base plate was pinned into place with the appropriate external rotation and stem site prepared. A preliminary range of motion was accomplished with the above size trial components. A polyethylene insert allowed the patient to obtain full extension as well as appropriate flexion. The patient's ligaments were stable in flexion and extension to medial and lateral stressing and the alignment was through the appropriate mechanical axis. The patella was then everted. The bone was resected appropriately for a pegged patella button. A trial reduction revealed appropriate tracking through the patellofemoral groove. All trial components were removed and the cut surfaces prepared for cementing with irrigation and debridement of the bone interstices. The implants were cemented into position and pressurized in the usual fashion. Bone and cement debris were meticulously removed. The betadine lavage protocol was used. [facility] knee was placed through range of motion and noted to be stable as mentioned above with the trail components. The wound was dry, therefore no drain was used. The operative knee was injected with 60cc of Naropin, 10 cc's of morphine and 1 cc of 30mg of Toradol. The capsular layer was closed using a #1 vicryl suture, while subcutaneous layers were closed using 2-0 Vicryl interrupted sutures. Finally the skin was closed using 3-0 Vicryl and skin staples, which were applied in occlusive fashion and sterile bandage applied. An Iceman cryo pad was applied on the operative leg. Sponge count and needle counts were correct. Rashawn Lopez left the operating room     Implants:   Implant Name Type Inv.  Item Serial No.  Lot No. LRB No. Used   CEMENT BNE HV R 40GM -- PALACOS R [id number] - [id number]  CEMENT BNE HV R 40GM -- PALACOS R [id number] [place] [address] [id number] Left 2   PAT EKATERINA DOME MEDIAL 41MM -- ATTUNE - [id number]  PAT EKATERINA DOME MEDIAL 41MM -- ATTUNE [id number] [facility] [employer] [id number] Left 1   FEM PS SZ 8 LT EKATERINA -- ATTUNE - [id number]  FEM PS SZ 8 LT EKATERINA -- ATTUNE [id number] [facility] [employer] [id number] Left 1   SIZE 8 CEMENTED TIBIAL BASE ROTATING PLATFORM    [id number] [employer] [id number] Left 1   INSERT TIB PS RP SZ 8 7MM -- ATTUNE - [id number]  INSERT TIB PS RP SZ 8 7MM -- ATTUNE [id number] [facility] [employer] [id number] Left 1     Signed By: Gay Bain MD

## 2019-03-12 NOTE — ANESTHESIA PROCEDURE NOTES
Peripheral Block    Start time: 3/12/2019 9:55 AM  End time: 3/12/2019 10:00 AM  Performed by: Charbel Braun MD  Authorized by: Charbel Braun MD       Pre-procedure: Indications: at surgeon's request and post-op pain management    Preanesthetic Checklist: patient identified, risks and benefits discussed, site marked, timeout performed, anesthesia consent given and patient being monitored    Timeout Time: 09:55          Block Type:   Block Type: Adductor canal  Laterality:  Left  Monitoring:  Standard ASA monitoring, responsive to questions, oxygen, continuous pulse ox, frequent vital sign checks and heart rate  Injection Technique:  Single shot  Procedures: ultrasound guided    Patient Position: supine  Prep: chlorhexidine    Location:  Mid thigh  Needle Type:  Stimuplex  Needle Gauge:  22 G  Needle Localization:  Ultrasound guidance    Assessment:  Number of attempts:  1  Injection Assessment:  Incremental injection every 5 mL, local visualized surrounding nerve on ultrasound, negative aspiration for blood, no intravascular symptoms, no paresthesia and ultrasound image on chart  Patient tolerance:  Patient tolerated the procedure well with no immediate complications  All needles out intact, proc. Tolerated well.

## 2019-03-12 NOTE — PERIOP NOTES
TRANSFER - OUT REPORT:    Verbal report given to Ace HAWLEY on Jr Majors  being transferred to preop block for routine progression of care       Report consisted of patients Situation, Background, Assessment and   Recommendations(SBAR). Information from the following report(s) SBAR, MAR and Recent Results was reviewed with the receiving nurse. Lines:   Peripheral IV 03/12/19 Left Hand (Active)   Site Assessment Clean, dry, & intact 3/12/2019  8:31 AM   Phlebitis Assessment 0 3/12/2019  8:31 AM   Infiltration Assessment 0 3/12/2019  8:31 AM   Dressing Status Clean, dry, & intact 3/12/2019  8:31 AM   Dressing Type Tape;Transparent 3/12/2019  8:31 AM   Hub Color/Line Status Green; Infusing 3/12/2019  8:31 AM   Action Taken Blood drawn 3/12/2019  8:31 AM        Opportunity for questions and clarification was provided.       Patient transported with:   Kartela

## 2019-03-12 NOTE — PROGRESS NOTES
TRANSFER - IN REPORT:    Verbal report received from [facility] on Grace Frankel  being received from Ortho for routine progression of care      Report consisted of patients Situation, Background, Assessment and   Recommendations(SBAR). Information from the following report(s) SBAR, Kardex, Intake/Output and MAR was reviewed with the receiving nurse. Opportunity for questions and clarification was provided. Assessment completed upon patients arrival to unit and care assumed.

## 2019-03-12 NOTE — ANESTHESIA PREPROCEDURE EVALUATION
Anesthetic History   No history of anesthetic complications            Review of Systems / Medical History  Patient summary reviewed and pertinent labs reviewed    Pulmonary                Comments: Snorer, no sleep study   Neuro/Psych   Within defined limits           Cardiovascular    Hypertension              Exercise tolerance: >4 METS     GI/Hepatic/Renal  Within defined limits              Endo/Other    Diabetes: well controlled, type 2  Hypothyroidism: well controlled       Other Findings              Physical Exam    Airway  Mallampati: II  TM Distance: 4 - 6 cm  Neck ROM: normal range of motion   Mouth opening: Normal     Cardiovascular    Rhythm: regular  Rate: normal         Dental    Dentition: Caps/crowns and Implants     Pulmonary  Breath sounds clear to auscultation               Abdominal         Other Findings            Anesthetic Plan    ASA: 2  Anesthesia type: spinal      Post-op pain plan if not by surgeon: peripheral nerve block single      Anesthetic plan and risks discussed with: Patient

## 2019-03-12 NOTE — PROGRESS NOTES
TRANSFER - IN REPORT:    Verbal report received from [facility]) on Emilia Mail  being received from [employer]) for routine post - op      Report consisted of patients Situation, Background, Assessment and   Recommendations(SBAR). Information from the following report(s) SBAR, Kardex, OR Summary, Procedure Summary, Intake/Output and MAR was reviewed with the receiving nurse. Opportunity for questions and clarification was provided. Assessment completed upon patients arrival to unit and care assumed.

## 2019-03-12 NOTE — ANESTHESIA PROCEDURE NOTES
Spinal Block    Start time: 3/12/2019 10:29 AM  End time: 3/12/2019 10:35 AM  Performed by: Janusz Trinidad MD  Authorized by: Janusz Trinidad MD     Pre-procedure:   Indications: primary anesthetic  Preanesthetic Checklist: patient identified, risks and benefits discussed, anesthesia consent, patient being monitored and timeout performed    Timeout Time: 10:29          Spinal Block:   Patient Position:  Seated  Prep Region:  Lumbar  Prep: chlorhexidine      Location:  L3-4  Technique:  Single shot    Local Dose (mL):  3    Needle:   Needle Type:  Pencan  Needle Gauge:  25 G  Attempts:  1      Events: CSF confirmed, no blood with aspiration and no paresthesia        Assessment:  Insertion:  Uncomplicated  Patient tolerance:  Patient tolerated the procedure well with no immediate complications  All needles out intact, procedure tolerated well without problems

## 2019-03-12 NOTE — PROGRESS NOTES
Problem: Self Care Deficits Care Plan (Adult)  Goal: *Acute Goals and Plan of Care (Insert Text)  GOALS:   DISCHARGE GOALS (in preparation for going home/rehab):  3 days  1. Mr. Kesha Do will perform one lower body dressing activity with minimal assistance required to demonstrate improved functional mobility and safety. 2.  Mr. Kesha Do will perform one lower body bathing activity with minimal assistance required to demonstrate improved functional mobility and safety. 3.  Mr. Kesha Do will perform toileting/toilet transfer with contact guard assistance to demonstrate improved functional mobility and safety. 4.  Mr. Kesha Do will perform shower transfer with contact guard assistance to demonstrate improved functional mobility and safety. JOINT CAMP OCCUPATIONAL THERAPY TKA: Initial Assessment and PM 3/12/2019  INPATIENT: Hospital Day: 1  [place] / [place] / Product Type: Medicare /      NAME/AGE/GENDER: Tala Germain is a 70 y.o. male   PRIMARY DIAGNOSIS:  Unilateral primary osteoarthritis, left knee [M17.12]   Procedure(s) and Anesthesia Type:     * KNEE ARTHROPLASTY TOTAL/ LEFT - Spinal (Left)  ICD-10: Treatment Diagnosis:    · Pain in Left Knee (M25.562)  · Stiffness of Left Knee, Not elsewhere classified (U78.667)      ASSESSMENT:     Mr. Kesha Do is s/p left TKA and presents with decreased weight bearing on left LE and decreased independence with functional mobility and activities of daily living as compared to baseline level of function and safety. Patient would benefit from skilled Occupational Therapy to maximize independence and safety with self-care task and functional mobility. Pt would also benefit from education on adaptive equipment and safety precautions in preparation for going home. This section established at most recent assessment   PROBLEM LIST (Impairments causing functional limitations):  1. Decreased Strength  2.  Decreased ADL/Functional Activities  3. Decreased Transfer Abilities  4. Increased Pain  5. Increased Fatigue  6. Decreased Flexibility/Joint Mobility  7. Decreased Knowledge of Precautions   INTERVENTIONS PLANNED: (Benefits and precautions of occupational therapy have been discussed with the patient.)  1. Activities of daily living training  2. Adaptive equipment training  3. Balance training  4. Clothing management  5. Donning&doffing training  6. Theraputic activity     TREATMENT PLAN: Frequency/Duration: Follow patient 1-2 times to address above goals. Rehabilitation Potential For Stated Goals: Good     RECOMMENDED REHABILITATION/EQUIPMENT: (at time of discharge pending progress): Continue Skilled Therapy and Home Health: Physical Therapy. OCCUPATIONAL PROFILE AND HISTORY:   History of Present Injury/Illness (Reason for Referral): Pt presents this date s/p (left) TKA. Past Medical History/Comorbidities:   Mr. Mp Buck  has a past medical history of Diabetes ([address] Hypertension, Hypothyroidism, and Osteoarthritis (4/25/2017). He also has no past medical history of Aneurysm ([address] Arrhythmia, Asthma, Autoimmune disease ([address] CAD (coronary artery disease), Cancer ([address] Chronic kidney disease, Chronic obstructive pulmonary disease ([address] Chronic pain, Coagulation disorder ([address] Endocarditis, GERD (gastroesophageal reflux disease), Heart failure ([address] Liver disease, Morbid obesity ([address] Psychiatric disorder, PUD (peptic ulcer disease), Rheumatic fever, Seizures ([address] Sleep apnea, Stroke ([address] or Thromboembolus ([address] Mr. Mp Buck  has a past surgical history that includes hx knee replacement (Right, 04/25/2017).   Social History/Living Environment:   Patient Expects to be Discharged to[de-identified] Other (comment)(PT WILL GO TO THE Or)  Prior Level of Function/Work/Activity:  Independent      Number of Personal Factors/Comorbidities that affect the Plan of Care: Brief history (0):  LOW COMPLEXITY   ASSESSMENT OF OCCUPATIONAL PERFORMANCE[de-identified]   Most Recent Physical Functioning:   Balance  Sitting: Intact  Standing: Pull to stand; With support       Gross Assessment  AROM: Within functional limits(right LE)  Strength: Within functional limits(right LE)          LLE AROM  L Knee Flexion: 75  L Knee Extension: 10 Coordination  Fine Motor Skills-Upper: Left Intact; Right Intact  Gross Motor Skills-Upper: Left Intact; Right Intact         Mental Status  Neurologic State: Alert  Orientation Level: Oriented X4  Cognition: Appropriate decision making  Perception: Appears intact  Perseveration: No perseveration noted  Safety/Judgement: Awareness of environment                Basic ADLs (From Assessment) Complex ADLs (From Assessment)   Basic ADL  Feeding: Independent  Oral Facial Hygiene/Grooming: Supervision  Bathing: Moderate assistance  Upper Body Dressing: Supervision  Lower Body Dressing: Moderate assistance  Toileting: Moderate assistance     Grooming/Bathing/Dressing Activities of Daily Living     Cognitive Retraining  Safety/Judgement: Awareness of environment                 Functional Transfers  Bathroom Mobility: Minimum assistance  Toilet Transfer : Minimum assistance  Shower Transfer: Minimum assistance     Bed/Mat Mobility  Supine to Sit: Contact guard assistance  Sit to Stand: Minimum assistance  Bed to Chair: Minimum assistance         Physical Skills Involved:  1. Range of Motion  2. Balance  3. Strength Cognitive Skills Affected (resulting in the inability to perform in a timely and safe manner): 1. none Psychosocial Skills Affected:  1. Environmental Adaptation   Number of elements that affect the Plan of Care: 3-5:  MODERATE COMPLEXITY   CLINICAL DECISION MAKIN [facility] [address] [id number] AM-PAC 6 Clicks   Daily Activity Inpatient Short Form  How much help from another person does the patient currently need. .. Total A Lot A Little None   1. Putting on and taking off regular lower body clothing? [] 1   [x] 2   [] 3   [] 4   2. Bathing (including washing, rinsing, drying)? [] 1   [] 2   [x] 3   [] 4   3. Toileting, which includes using toilet, bedpan or urinal?   [] 1   [] 2   [x] 3   [] 4   4. Putting on and taking off regular upper body clothing? [] 1   [] 2   [] 3   [x] 4   5. Taking care of personal grooming such as brushing teeth? [] 1   [] 2   [] 3   [x] 4   6. Eating meals? [] 1   [] 2   [] 3   [x] 4   © 2007, Trustees of [address] [place] [address] [address] [id number], under license to [employer]. All rights reserved     Score:  Initial: 20 Most Recent: X (Date: -- )    Interpretation of Tool:  Represents activities that are increasingly more difficult (i.e. Bed mobility, Transfers, Gait). Medical Necessity:     · Patient is expected to demonstrate progress in range of motion, balance and functional technique to increase independence with self care. Reason for Services/Other Comments:  · Patient would benefit from skilled Occupational Therapy to maximize independence and safety with self-care task and functional mobility. .   Use of outcome tool(s) and clinical judgement create a POC that gives a: LOW COMPLEXITY            TREATMENT:   (In addition to Assessment/Re-Assessment sessions the following treatments were rendered)     Pre-treatment Symptoms/Complaints:  Pt up to edge of bed without complaint of pain   Pain: Initial:   Pain Intensity 1: 0 0 Post Session:  0     Assessment/Reassessment only, no treatment provided today    Treatment/Session Assessment:     Response to Treatment:  Pt up to edge of bed tolerated well.     Education:  [] Home Exercises  [x] Fall Precautions  [] Hip Precautions [] Going Home Video  [x] Knee/Hip Prosthesis Review  [x] Walker Management/Safety [x] Adaptive Equipment as Needed       Interdisciplinary Collaboration:   o Physical Therapist  o Occupational Therapist  o Registered Nurse    After treatment position/precautions:   o Up in chair  o Bed/Chair-wheels locked  o Call light within reach  o RN notified  o Family at bedside     Compliance with Program/Exercises: Compliant all of the time. Recommendations/Intent for next treatment session:  Treatment next visit will focus on increasing Mr. Richmond's independence with bed mobility, transfers, self care, functional mobility, modalities for pain, and patient education.       Total Treatment Duration:  OT Patient Time In/Time Out  Time In: 1435  Time Out: [address] Adah Kawasaki, OT

## 2019-03-12 NOTE — PERIOP NOTES
Teach back method used in review of Hibiclens usage preop/postop, TB screening, pain management goals, falls precautions and use of Nozin for prevention of staph infections. Incentive spirometer reviewed and pt reached 2500   in preop holding.

## 2019-03-12 NOTE — PROGRESS NOTES
Problem: Mobility Impaired (Adult and Pediatric)  Goal: *Acute Goals and Plan of Care (Insert Text)  GOALS (1-4 days):  (1.)Mr. Brielle Reyna will move from supine to sit and sit to supine  in bed with STAND BY ASSIST.  (2.)Mr. Brielle Reyna will transfer from bed to chair and chair to bed with STAND BY ASSIST using the least restrictive device. (3.)Mr. Brielle Reyna will ambulate with STAND BY ASSIST for 150 feet with the least restrictive device. (4.)Mr. Brielle Reyna will ambulate up/down 2 steps with bilateral  railing with CONTACT GUARD ASSIST with no device. (5.)Mr. Brielle Reyna will increase left knee ROM to 5°-80°.  ________________________________________________________________________________________________      PHYSICAL THERAPY Joint camp tKa: Initial Assessment, PM 3/12/2019  INPATIENT: Hospital Day: 1  [place] / [place] / Product Type: Medicare /      NAME/AGE/GENDER: Geovanna Casarez is a 70 y.o. male   PRIMARY DIAGNOSIS:  Unilateral primary osteoarthritis, left knee [M17.12]   Procedure(s) and Anesthesia Type:     * KNEE ARTHROPLASTY TOTAL/ LEFT - Spinal (Left)  ICD-10: Treatment Diagnosis:    · Pain in Left Knee (M25.562)  · Stiffness of Left Knee, Not elsewhere classified (M25.662)  · Difficulty in walking, Not elsewhere classified (R26.2)      ASSESSMENT:     Mr. Brielle Reyna presents with decreased rom and strength of left LE as well as decreased functional mobility and gait s/p left tka. He plans to go home with HHPT. This section established at most recent assessment   PROBLEM LIST (Impairments causing functional limitations):  1. Decreased Strength  2. Decreased ADL/Functional Activities  3. Decreased Transfer Abilities  4. Decreased Ambulation Ability/Technique  5. Decreased Balance  6. Increased Pain  7. Decreased Activity Tolerance  8. Decreased Flexibility/Joint Mobility  9.  Decreased [place] with Home Exercise Program   INTERVENTIONS PLANNED: (Benefits and precautions of physical therapy have been discussed with the patient.)  1. Bed Mobility  2. Gait Training  3. Home Exercise Program (HEP)  4. Therapeutic Exercise/Strengthening  5. Transfer Training  6. Range of Motion: active/assisted/passive  7. Therapeutic Activities  8. Group Therapy     TREATMENT PLAN: Frequency/Duration: Follow patient BID for duration of hospital stay to address above goals. Rehabilitation Potential For Stated Goals: Good     RECOMMENDED REHABILITATION/EQUIPMENT: (at time of discharge pending progress): Continue Skilled Therapy and Home Health: Physical Therapy. HISTORY:   History of Present Injury/Illness (Reason for Referral):  S/p left tka  Past Medical History/Comorbidities:   Mr. Liz Dc  has a past medical history of Diabetes ([address] Hypertension, Hypothyroidism, and Osteoarthritis (4/25/2017). He also has no past medical history of Aneurysm ([address] Arrhythmia, Asthma, Autoimmune disease ([address] CAD (coronary artery disease), Cancer ([address] Chronic kidney disease, Chronic obstructive pulmonary disease ([address] Chronic pain, Coagulation disorder ([address] Endocarditis, GERD (gastroesophageal reflux disease), Heart failure ([address] Liver disease, Morbid obesity ([address] Psychiatric disorder, PUD (peptic ulcer disease), Rheumatic fever, Seizures ([address] Sleep apnea, Stroke ([address] or Thromboembolus ([address] Mr. Liz Dc  has a past surgical history that includes hx knee replacement (Right, 04/25/2017). Social History/Living Environment:   Patient Expects to be Discharged to[de-identified] Other (comment)(PT WILL GO TO THE [place])  Prior Level of Function/Work/Activity:  independent   Number of Personal Factors/Comorbidities that affect the Plan of Care: 1-2: MODERATE COMPLEXITY   EXAMINATION:   Most Recent Physical Functioning:      Gross Assessment  AROM: Within functional limits(right LE)  Strength:  Within functional limits(right LE)          LLE AROM  L Knee Flexion: 75  L Knee Extension: 10          Bed Mobility  Supine to Sit: Contact guard assistance    Transfers  Sit to Stand: Minimum assistance  Stand to Sit: Minimum assistance  Bed to Chair: Minimum assistance    Balance  Sitting: Intact  Standing: Pull to stand; With support              Weight Bearing Status  Left Side Weight Bearing: As tolerated  Distance (ft): 4 Feet (ft)  Ambulation - Level of Assistance: Minimal assistance  Assistive Device: Walker, rolling  Speed/Alexus: Delayed  Step Length: Left shortened;Right shortened  Stance: Left decreased  Gait Abnormalities: Antalgic;Decreased step clearance  Interventions: Safety awareness training;Verbal cues     Braces/Orthotics:     Left Knee Cold  Type: Cryocuff      Body Structures Involved:  1. Bones  2. Joints  3. Muscles  4. Ligaments Body Functions Affected:  1. Movement Related Activities and Participation Affected:  1. Mobility   Number of elements that affect the Plan of Care: 3: MODERATE COMPLEXITY   CLINICAL PRESENTATION:   Presentation: Stable and uncomplicated: LOW COMPLEXITY   CLINICAL DECISION MAKING:   [facility] AM-PAC 6 Clicks   Basic Mobility Inpatient Short Form  How much difficulty does the patient currently have. .. Unable A Lot A Little None   1. Turning over in bed (including adjusting bedclothes, sheets and blankets)? [] 1   [] 2   [x] 3   [] 4   2. Sitting down on and standing up from a chair with arms ( e.g., wheelchair, bedside commode, etc.)   [] 1   [] 2   [x] 3   [] 4   3. Moving from lying on back to sitting on the side of the bed? [] 1   [] 2   [x] 3   [] 4   How much help from another person does the patient currently need. .. Total A Lot A Little None   4. Moving to and from a bed to a chair (including a wheelchair)? [] 1   [] 2   [x] 3   [] 4   5. Need to walk in hospital room? [] 1   [x] 2   [] 3   [] 4   6. Climbing 3-5 steps with a railing? [] 1   [x] 2   [] 3   [] 4   © 2007, Trustees of [facility], under license to [employer].  All rights reserved        Score: Initial: 16 Most Recent: X (Date: -- )    Interpretation of Tool:  Represents activities that are increasingly more difficult (i.e. Bed mobility, Transfers, Gait). Medical Necessity:     · Patient is expected to demonstrate progress in strength, range of motion and balance to decrease assistance required with theraputic exercises and functional mobility. Reason for Services/Other Comments:  · Patient continues to require present interventions due to patient's inability to perform theraputic exercises and functional mobility independently. Use of outcome tool(s) and clinical judgement create a POC that gives a: Clear prediction of patient's progress: LOW COMPLEXITY            TREATMENT:   (In addition to Assessment/Re-Assessment sessions the following treatments were rendered)     Pre-treatment Symptoms/Complaints:  none  Pain: Initial:      Post Session:  0     Therapeutic Exercise: (10 Minutes):  Exercises per grid below to improve mobility and strength. Required minimal visual, verbal and manual cues to promote proper body alignment, promote proper body posture and promote proper body mechanics. Progressed range and repetitions as indicated. Date:  3/12 Date:   Date:     ACTIVITY/EXERCISE AM PM AM PM AM PM   GROUP THERAPY  []  []  []  []  []  []   Ankle Pumps  10a       Quad Sets  10a       Gluteal Sets  10a       Hip ABd/ADduction  10a       Straight Leg Raises         Knee Slides  10a       Short Arc Quads         Long Arc Quads         Chair Slides                  B = bilateral; AA = active assistive; A = active; P = passive      Treatment/Session Assessment:     Response to Treatment:  Pt. Did fine, some weakness in LE's limiting gait.     Education:  [x] Home Exercises  [x] Fall Precautions  [] Hip Precautions [] D/C Instruction Review  [x] Knee/Hip Prosthesis Review  [x] Walker Management/Safety [] Adaptive Equipment as Needed       Interdisciplinary Collaboration:   o Occupational Therapist  o Registered Nurse    After treatment position/precautions:   o Up in chair  o Bed/Chair-wheels locked  o Bed in low position  o Caregiver at bedside  o Call light within reach  o Family at bedside    Compliance with Program/Exercises: Will assess as treatment progresses. No questions. Recommendations/Intent for next treatment session:  Treatment next visit will focus on increasing Mr. Mabrys independence with bed mobility, transfers, gait training, strength/ROM exercises, modalities for pain, and patient education.       Total Treatment Duration:  PT Patient Time In/Time Out  Time In: 1415  Time Out: [address], PT

## 2019-03-12 NOTE — PROGRESS NOTES
Care Management Interventions  Transition of Care Consult (CM Consult): [address]: Yes  Discharge Durable Medical Equipment: Yes  Physical Therapy Consult: Yes  Occupational Therapy Consult: Yes  Current Support Network: Lives with Spouse  Confirm Follow Up Transport: Family  Plan discussed with Pt/Family/Caregiver: Yes  Freedom of Choice Offered: Yes  Discharge Location  Discharge Placement: Home with home health    Patient is a 70y.o. year old male admitted for Left TKA . Patient lives with His spouse and plans to return home on discharge. Order received to arrange home health. Patient without preference towards agency. Referral sent to [facility]. Patient  has a walker. Patient requesting we arrange a bedside commode. Referral sent to [facility] who will deliver to the hospital room prior to discharge. Will follow until discharge.

## 2019-03-12 NOTE — ANESTHESIA POSTPROCEDURE EVALUATION
Procedure(s):  KNEE ARTHROPLASTY TOTAL/ LEFT. Anesthesia Post Evaluation      Multimodal analgesia: multimodal analgesia used between 6 hours prior to anesthesia start to PACU discharge  Patient location during evaluation: bedside  Patient participation: complete - patient participated  Level of consciousness: awake and alert  Pain management: adequate  Airway patency: patent  Anesthetic complications: no  Cardiovascular status: hemodynamically stable  Respiratory status: spontaneous ventilation  Hydration status: euvolemic  Comments: Patient stable and may discharge at this time. Good result with spinal anesthesia, resolving normally.   Visit Vitals  /69 (BP 1 Location: Right arm, BP Patient Position: At rest;Supine)   Pulse 87   Temp 36.7 °C (98 °F)   Resp 20   Ht 5' 8\" (1.727 m)   Wt 95.3 kg (209 lb 16 oz)   SpO2 95%   BMI 31.93 kg/m²

## 2019-03-12 NOTE — PROGRESS NOTES
Joint Camp Notes Reviewed. Pt working on IS. Pt encouraged to do 10 breaths per hour while awake on IS. Good NPC. No respiratory distress noted at this time. No complications noted at this time. Pt able to do 3000 cc/sec on IS.  Monitor no  number set up in room

## 2019-03-12 NOTE — PROGRESS NOTES
[facility] - INPATIENT  Face to Face Encounter    Patients Name: Pushpa Fletcher    YOB: 1955    Ordering Physician: Dusty Ormond    Primary Diagnosis: Unilateral primary osteoarthritis, left knee [M17.12]  Arthritis of knee, left [M17.12]  S/p left TKA    Date of Face to Face:   3/12/2019                                  Face to Face Encounter findings are related to primary reason for home care:   yes. 1. I certify that the patient needs intermittent care as follows: physical therapy: gait/stair training    2. I certify that this patient is homebound, that is: 1) patient requires the use of a walker device, special transportation, or assistance of another to leave the home; or 2) patient's condition makes leaving the home medically contraindicated; and 3) patient has a normal inability to leave the home and leaving the home requires considerable and taxing effort. Patient may leave the home for infrequent and short duration for medical reasons, and occasional absences for non-medical reasons. Homebound status is due to the following functional limitations: Patient's ambulation limited secondary to severe pain and requires the use of an assistive device and the assistance of a caregiver for safe completion. Patient with strength and ROM deficits limiting ambulation endurance requiring the use of an assistive device and the assistance of a caregiver. Patient deemed temporarily homebound secondary to increased risk for infection when leaving home and going out into the community. 3. I certify that this patient is under my care and that I, or a nurse practitioner or [phone], or clinical nurse specialist, or certified nurse midwife, working with me, had a Face-to-Face Encounter that meets the physician Face-to-Face Encounter requirements.   The following are the clinical findings from the [address] [place] [address] encounter that support the need for skilled services and is a summary of the encounter: see hospital chart        Aniya Almaguer, BSW  3/12/2019      THE FOLLOWING TO BE COMPLETED BY THE COMMUNITY PHYSICIAN:    I concur with the findings described above from the F2F encounter that this patient is homebound and in need of a skilled service.     Certifying Physician: _____________________________________      Printed Certifying Physician Name: _____________________________________    Date: _________________

## 2019-03-12 NOTE — INTERVAL H&P NOTE
H&P Update:  Pushpa Fletcher was seen and examined. History and physical has been reviewed. The patient has been examined.  There have been no significant clinical changes since the completion of the originally dated History and Physical.    Signed By: VANESSA Candelaria     March 12, 2019 8:56 AM

## 2019-03-12 NOTE — PERIOP NOTES
TRANSFER - OUT REPORT:    Verbal report given to Lorelei jackson RN on Emilia Mail  being transferred to ortho room 319 for routine post - op       Report consisted of patients Situation, Background, Assessment and   Recommendations(SBAR). Information from the following report(s) SBAR, OR Summary, Intake/Output and MAR was reviewed with the receiving nurse. Lines:   Peripheral IV 03/12/19 Left Hand (Active)   Site Assessment Clean, dry, & intact 3/12/2019 12:20 PM   Phlebitis Assessment 0 3/12/2019 12:20 PM   Infiltration Assessment 0 3/12/2019 12:20 PM   Dressing Status Clean, dry, & intact 3/12/2019 12:20 PM   Dressing Type Tape;Transparent 3/12/2019 12:20 PM   Hub Color/Line Status Green; Infusing 3/12/2019 12:20 PM   Action Taken Blood drawn 3/12/2019  8:31 AM        Opportunity for questions and clarification was provided.       Patient transported with:   O2 @ 3 liters

## 2019-03-13 VITALS
HEIGHT: 68 IN | RESPIRATION RATE: 20 BRPM | HEART RATE: 82 BPM | DIASTOLIC BLOOD PRESSURE: 72 MMHG | WEIGHT: 210 LBS | TEMPERATURE: 97.4 F | SYSTOLIC BLOOD PRESSURE: 126 MMHG | OXYGEN SATURATION: 97 % | BODY MASS INDEX: 31.83 KG/M2

## 2019-03-13 PROBLEM — Z96.652 TOTAL KNEE REPLACEMENT STATUS, LEFT: Status: ACTIVE | Noted: 2019-03-13

## 2019-03-13 LAB
ANION GAP SERPL CALC-SCNC: 7 MMOL/L
BUN SERPL-MCNC: 8 MG/DL (ref 8–23)
CALCIUM SERPL-MCNC: 8.6 MG/DL (ref 8.3–10.4)
CHLORIDE SERPL-SCNC: 110 MMOL/L (ref 98–107)
CO2 SERPL-SCNC: 24 MMOL/L (ref 21–32)
CREAT SERPL-MCNC: 0.76 MG/DL (ref 0.8–1.5)
GLUCOSE BLD STRIP.AUTO-MCNC: 134 MG/DL (ref 65–100)
GLUCOSE SERPL-MCNC: 161 MG/DL (ref 65–100)
POTASSIUM SERPL-SCNC: 4.4 MMOL/L (ref 3.5–5.1)
SODIUM SERPL-SCNC: 141 MMOL/L (ref 136–145)

## 2019-03-13 PROCEDURE — 97116 GAIT TRAINING THERAPY: CPT

## 2019-03-13 PROCEDURE — 82962 GLUCOSE BLOOD TEST: CPT

## 2019-03-13 PROCEDURE — 97110 THERAPEUTIC EXERCISES: CPT

## 2019-03-13 PROCEDURE — 74011250637 HC RX REV CODE- 250/637: Performed by: ORTHOPAEDIC SURGERY

## 2019-03-13 PROCEDURE — 97150 GROUP THERAPEUTIC PROCEDURES: CPT

## 2019-03-13 PROCEDURE — 36415 COLL VENOUS BLD VENIPUNCTURE: CPT

## 2019-03-13 PROCEDURE — 80048 BASIC METABOLIC PNL TOTAL CA: CPT

## 2019-03-13 PROCEDURE — 97535 SELF CARE MNGMENT TRAINING: CPT

## 2019-03-13 PROCEDURE — 94762 N-INVAS EAR/PLS OXIMTRY CONT: CPT

## 2019-03-13 PROCEDURE — 74011250636 HC RX REV CODE- 250/636: Performed by: ORTHOPAEDIC SURGERY

## 2019-03-13 RX ORDER — OXYCODONE HYDROCHLORIDE 5 MG/1
5-10 TABLET ORAL
Qty: 60 TAB | Refills: 0 | Status: SHIPPED | OUTPATIENT
Start: 2019-03-13 | End: 2019-03-20

## 2019-03-13 RX ORDER — ASPIRIN 81 MG/1
81 TABLET ORAL EVERY 12 HOURS
Qty: 60 TAB | Refills: 0 | Status: SHIPPED | OUTPATIENT
Start: 2019-03-13 | End: 2019-04-12

## 2019-03-13 RX ADMIN — ACETAMINOPHEN 1000 MG: 500 TABLET, FILM COATED ORAL at 12:20

## 2019-03-13 RX ADMIN — ACETAMINOPHEN 1000 MG: 500 TABLET, FILM COATED ORAL at 06:24

## 2019-03-13 RX ADMIN — METFORMIN HYDROCHLORIDE: 500 TABLET, FILM COATED ORAL at 07:59

## 2019-03-13 RX ADMIN — LEVOTHYROXINE SODIUM 88 MCG: 88 TABLET ORAL at 06:24

## 2019-03-13 RX ADMIN — OXYCODONE HYDROCHLORIDE 5 MG: 5 TABLET ORAL at 12:20

## 2019-03-13 RX ADMIN — Medication 2 G: at 01:11

## 2019-03-13 RX ADMIN — ASPIRIN 81 MG: 81 TABLET, COATED ORAL at 07:59

## 2019-03-13 RX ADMIN — Medication 1 AMPULE: at 07:58

## 2019-03-13 RX ADMIN — Medication 5 ML: at 06:24

## 2019-03-13 RX ADMIN — LOSARTAN POTASSIUM 25 MG: 25 TABLET ORAL at 07:59

## 2019-03-13 RX ADMIN — ACETAMINOPHEN 1000 MG: 500 TABLET, FILM COATED ORAL at 00:11

## 2019-03-13 RX ADMIN — SENNOSIDES AND DOCUSATE SODIUM 2 TABLET: 8.6; 5 TABLET ORAL at 07:59

## 2019-03-13 NOTE — DISCHARGE INSTRUCTIONS
[address] [facility]   Patient Discharge Instructions    Joyce Chandler / [id number] : 1955    Admitted 3/12/2019 Discharged: 3/13/2019     IF YOU HAVE ANY PROBLEMS ONCE YOU ARE AT HOME CALL THE FOLLOWING NUMBERS:   Main office number: [phone]    Take Home Medications     · It is important that you take the medication exactly as they are prescribed. · Keep your medication in the bottles provided by the pharmacist and keep a list of the medication names, dosages, and times to be taken in your wallet. · Do not take other medications without consulting your doctor. What to do at [address] your prehospital diet. If you have excessive nausea or vomitting call your doctor's office     Home Physical Therapy is arranged. Use rolling walker when walking. Patients who have had a joint replacement should not drive until you are seen for your follow up appointment by Dr. Marielos Auguste. When to Call    - Call if you have a temperature greater then 101  - Unable to keep food down  - Loose control of your bladder or bowel function  - Are unable to bear any weight   - Need a pain medication refill       DISCHARGE SUMMARY from Nurse    The following personal items collected during your admission are returned to you:   Dental Appliance: Dental Appliances: None  Vision: Visual Aid: Glasses  Hearing Aid:   na  Jewelry: Jewelry: None  Clothing:   self  Other Valuables: Other Valuables: Cell Phone(with vBrand)  Valuables sent to safe:   na    PATIENT INSTRUCTIONS:    After general anesthesia or intravenous sedation, for 24 hours or while taking prescription Narcotics:  · Limit your activities  · Do not drive and operate hazardous machinery  · Do not make important personal or business decisions  · Do  not drink alcoholic beverages  · If you have not urinated within 8 hours after discharge, please contact your surgeon on call.     Report the following to your surgeon:  · Excessive pain, swelling, redness or odor of or around the surgical area  · Temperature over 101  · Nausea and vomiting lasting longer than 4 hours or if unable to take medications  · Any signs of decreased circulation or nerve impairment to extremity: change in color, persistent  numbness, tingling, coldness or increase pain  · Any questions, call office @ [phone]      Keep scheduled follow up appointment. If need to change, call office @ [phone]. *  Please give a list of your current medications to your Primary Care Provider. *  Please update this list whenever your medications are discontinued, doses are      changed, or new medications (including over-the-counter products) are added. *  Please carry medication information at all times in case of emergency situations. Patient Education        Total Knee Replacement: What to Expect at Home  Your Recovery    When you leave the hospital, you should be able to move around with a walker or crutches. But you will need someone to help you at home for the next few weeks or until you have more energy and can move around better. If there is no one to help you at home, you may go to a rehabilitation center. You will go home with a bandage and stitches, staples, tissue glue, or tape strips. Change the bandage as your doctor tells you to. If you have stitches or staples, your doctor will remove them 10 to 21 days after your surgery. Glue or tape strips will fall off on their own over time. You may still have some mild pain, and the area may be swollen for 3 to 6 months after surgery. Your knee will continue to improve for 6 to 12 months. You will probably use a walker for 1 to 3 weeks and then use crutches. When you are ready, you can use a cane. You will probably be able to walk on your own in 4 to 8 weeks. You will need to do months of physical rehabilitation (rehab) after a knee replacement. Rehab will help you strengthen the muscles of the knee and help you regain movement.  After you recover, your artificial knee will allow you to do normal daily activities with less pain or no pain at all. You may be able to hike, dance, ride a bike, and play golf. Talk to your doctor about whether you can do more strenuous activities. Always tell your caregivers that you have an artificial knee. How long it will take to walk on your own, return to normal activities, and go back to work depends on your health and how well your rehabilitation (rehab) program goes. The better you do with your rehab exercises, the quicker you will get your strength and movement back. This care sheet gives you a general idea about how long it will take for you to recover. But each person recovers at a different pace. Follow the steps below to get better as quickly as possible. How can you care for yourself at home? Activity    · Rest when you feel tired. You may take a nap, but do not stay in bed all day. When you sit, use a chair with arms. You can use the arms to help you stand up.     · Work with your physical therapist to find the best way to exercise. You may be able to take frequent, short walks using crutches or a walker. What you can do as your knee heals will depend on whether your new knee is cemented or uncemented. You may not be able to do certain things for a while if your new knee is uncemented.     · After your knee has healed enough, you can do more strenuous activities with caution. ? You can golf, but use a golf cart, and do not wear shoes with spikes. ? You can bike on a flat road or on a stationary bike. Avoid biking up hills. ? Your doctor may suggest that you stay away from activities that put stress on your knee. These include tennis or badminton, squash or racquetball, contact sports like football, jumping (such as in basketball), jogging, or running. ? Avoid activities where you might fall. These include horseback riding, skiing, and mountain biking.     · Do not sit for more than 1 hour at a time. Get up and walk around for a while before you sit again. If you must sit for a long time, prop up your leg with a chair or footstool. This will help you avoid swelling.     · Ask your doctor when you can drive again. It may take up to 8 weeks after knee replacement surgery before it is safe for you to drive.     · When you get into a car, sit on the edge of the seat. Then pull in your legs, and turn to face the front.     · You should be able to do many everyday activities 3 to 6 weeks after your surgery. You will probably need to take 4 to 16 weeks off from work. When you can go back to work depends on the type of work you do and how you feel.     · Ask your doctor when it is okay for you to have sex.     · Do not lift anything heavier than 10 pounds and do not lift weights for 12 weeks. Diet    · By the time you leave the hospital, you should be eating your normal diet. If your stomach is upset, try bland, low-fat foods like plain rice, broiled chicken, toast, and yogurt. Your doctor may suggest that you take iron and vitamin supplements.     · Drink plenty of fluids (unless your doctor tells you not to).   · Eat healthy foods, and watch your portion sizes. Try to stay at your ideal weight. Too much weight puts more stress on your new knee.     · You may notice that your bowel movements are not regular right after your surgery. This is common. Try to avoid constipation and straining with bowel movements. You may want to take a fiber supplement every day. If you have not had a bowel movement after a couple of days, ask your doctor about taking a mild laxative. Medicines    · Your doctor will tell you if and when you can restart your medicines. He or she will also give you instructions about taking any new medicines.     · If you take blood thinners, such as warfarin (Coumadin), clopidogrel (Plavix), or aspirin, be sure to talk to your doctor.  He or she will tell you if and when to start taking those medicines again. Make sure that you understand exactly what your doctor wants you to do.     · Your doctor may give you a blood-thinning medicine to prevent blood clots. If you take a blood thinner, be sure you get instructions about how to take your medicine safely. Blood thinners can cause serious bleeding problems. This medicine could be in pill form or as a shot (injection). If a shot is necessary, your doctor will tell you how to do this.     · Be safe with medicines. Take pain medicines exactly as directed. ? If the doctor gave you a prescription medicine for pain, take it as prescribed. ? If you are not taking a prescription pain medicine, ask your doctor if you can take an over-the-counter medicine. ? Plan to take your pain medicine 30 minutes before exercises. It is easier to prevent pain before it starts than to stop it once it has started.     · If you think your pain medicine is making you sick to your stomach:  ? Take your medicine after meals (unless your doctor has told you not to). ? Ask your doctor for a different pain medicine.     · If your doctor prescribed antibiotics, take them as directed. Do not stop taking them just because you feel better. You need to take the full course of antibiotics. Incision care    · If your doctor told you how to care for your cut (incision), follow your doctor's instructions. You will have a dressing over the cut. A dressing helps the incision heal and protects it. Your doctor will tell you how to take care of this.     · If you did not get instructions, follow this general advice:  ? If you have strips of tape on the cut the doctor made, leave the tape on for a week or until it falls off.  ? If you have stitches or staples, your doctor will tell you when to come back to have them removed. ? If you have skin adhesive on the cut, leave it on until it falls off. Skin adhesive is also called glue or liquid stitches. ? Change the bandage every day. ?  Wash the area daily with warm water, and pat it dry. Don't use hydrogen peroxide or alcohol. They can slow healing. ? You may cover the area with a gauze bandage if it oozes fluid or rubs against clothing. ? You may shower 24 to 48 hours after surgery. Pat the incision dry. Don't swim or take a bath for the first 2 weeks, or until your doctor tells you it is okay. Exercise    · Your rehab program will give you a number of exercises to do to help you get back your knee's range of motion and strength. Always do them as your therapist tells you. Ice and elevation    · For pain and swelling, put ice or a cold pack on the area for 10 to 20 minutes at a time. Put a thin cloth between the ice and your skin. Other instructions    · Continue to wear your support stockings as your doctor says. These help to prevent blood clots. The length of time that you will have to wear them depends on your activity level and the amount of swelling.     · You have metal pieces in your knee. These may set off some airport metal detectors. Carry a medical alert card that says you have an artificial joint, just in case. Follow-up care is a key part of your treatment and safety. Be sure to make and go to all appointments, and call your doctor if you are having problems. It's also a good idea to know your test results and keep a list of the medicines you take. When should you call for help? Call 911 anytime you think you may need emergency care. For example, call if:    · You passed out (lost consciousness).     · You have severe trouble breathing.     · You have sudden chest pain and shortness of breath, or you cough up blood.    Call your doctor now or seek immediate medical care if:    · You have signs of infection, such as:  ? Increased pain, swelling, warmth, or redness. ? Red streaks leading from the incision. ? Pus draining from the incision. ?  A fever.     · You have signs of a blood clot, such as:  ? Pain in your calf, back of the knee, thigh, or groin. ? Redness and swelling in your leg or groin.     · Your incision comes open and begins to bleed, or the bleeding increases.     · You have pain that does not get better after you take pain medicine.    Watch closely for changes in your health, and be sure to contact your doctor if:    · You do not have a bowel movement after taking a laxative. Where can you learn more? Go to http://genie-brooke.info/. Enter A256 in the search box to learn more about \"Total Knee Replacement: What to Expect at Home. \"  Current as of: September 20, 2018  Content Version: 11.9  © 3070-2705 [employer]. Care instructions adapted under license by [employer] (which disclaims liability or warranty for this information). If you have questions about a medical condition or this instruction, always ask your healthcare professional. [address] any warranty or liability for your use of this information. These are general instructions for a healthy lifestyle:    No smoking/ No tobacco products/ Avoid exposure to second hand smoke    Surgeon General's Warning:  Quitting smoking now greatly reduces serious risk to your health. Obesity, smoking, and sedentary lifestyle greatly increases your risk for illness    A healthy diet, regular physical exercise & weight monitoring are important for maintaining a healthy lifestyle    You may be retaining fluid if you have a history of heart failure or if you experience any of the following symptoms:  Weight gain of 3 pounds or more overnight or 5 pounds in a week, increased swelling in our hands or feet or shortness of breath while lying flat in bed. Please call your doctor as soon as you notice any of these symptoms; do not wait until your next office visit.     Recognize signs and symptoms of STROKE:    F-face looks uneven    A-arms unable to move or move even    S-speech slurred or non-existent    T-time-call 911 as soon as signs and symptoms begin-DO NOT go       Back to bed or wait to see if you get better-TIME IS BRAIN. The discharge information has been reviewed with the patient. The patient verbalized understanding. Information obtained by :  I understand that if any problems occur once I am at home I am to contact my physician. I understand and acknowledge receipt of the instructions indicated above.                                                                                                                                            Physician's or R.N.'s Signature                                                                  Date/Time                                                                                                                                              Patient or Representative Signature                                                          Date/Time

## 2019-03-13 NOTE — PROGRESS NOTES
03/13/19 0749   Oxygen Therapy   O2 Sat (%) 97 %   Pulse via Oximetry 100 beats per minute   O2 Device Room air   O2 Flow Rate (L/min) 0 l/min

## 2019-03-13 NOTE — PROGRESS NOTES
Problem: Mobility Impaired (Adult and Pediatric)  Goal: *Acute Goals and Plan of Care (Insert Text)  GOALS (1-4 days):  (1.)Mr. Angie Nielsen will move from supine to sit and sit to supine  in bed with STAND BY ASSIST.  (2.)Mr. Angie Nielsen will transfer from bed to chair and chair to bed with STAND BY ASSIST using the least restrictive device. MET 3/13/19  (3.)Mr. Angie Nielsen will ambulate with STAND BY ASSIST for 150 feet with the least restrictive device. (4.)Mr. Angie Nielsen will ambulate up/down 2 steps with bilateral  railing with CONTACT GUARD ASSIST with no device. (5.)Mr. Angie Nielsen will increase left knee ROM to 5°-80°.  ________________________________________________________________________________________________      PHYSICAL THERAPY Joint camp tKa: Daily Note, PM 3/13/2019  INPATIENT: Hospital Day: 2  [place] / [place] / Product Type: Medicare /      NAME/AGE/GENDER: Guerita Larson is a 70 y.o. male   PRIMARY DIAGNOSIS:  Unilateral primary osteoarthritis, left knee [M17.12]   Procedure(s) and Anesthesia Type:     * KNEE ARTHROPLASTY TOTAL/ LEFT - Spinal (Left)  ICD-10: Treatment Diagnosis:    · Pain in Left Knee (M25.562)  · Stiffness of Left Knee, Not elsewhere classified (M25.662)  · Difficulty in walking, Not elsewhere classified (R26.2)      ASSESSMENT:     Mr. Angie Nielsen is sitting up in chair on arrival.  He is motivated to participate with PT and plans to go home today with HHPT. This section established at most recent assessment   PROBLEM LIST (Impairments causing functional limitations):  1. Decreased Strength  2. Decreased ADL/Functional Activities  3. Decreased Transfer Abilities  4. Decreased Ambulation Ability/Technique  5. Decreased Balance  6. Increased Pain  7. Decreased Activity Tolerance  8. Decreased Flexibility/Joint Mobility  9.  Decreased Kleberg with Home Exercise Program   INTERVENTIONS PLANNED: (Benefits and precautions of physical therapy have been discussed with the patient.)  1. Bed Mobility  2. Gait Training  3. Home Exercise Program (HEP)  4. Therapeutic Exercise/Strengthening  5. Transfer Training  6. Range of Motion: active/assisted/passive  7. Therapeutic Activities  8. Group Therapy     TREATMENT PLAN: Frequency/Duration: Follow patient BID for duration of hospital stay to address above goals. Rehabilitation Potential For Stated Goals: Good     RECOMMENDED REHABILITATION/EQUIPMENT: (at time of discharge pending progress): Continue Skilled Therapy and Home Health: Physical Therapy. HISTORY:   History of Present Injury/Illness (Reason for Referral):  S/p left tka  Past Medical History/Comorbidities:   Mr. Danny Block  has a past medical history of Diabetes ([address] Hypertension, Hypothyroidism, and Osteoarthritis (4/25/2017). He also has no past medical history of Aneurysm ([address] Arrhythmia, Asthma, Autoimmune disease ([address] CAD (coronary artery disease), Cancer ([address] Chronic kidney disease, Chronic obstructive pulmonary disease ([address] Chronic pain, Coagulation disorder ([address] Endocarditis, GERD (gastroesophageal reflux disease), Heart failure ([address] Liver disease, Morbid obesity ([address] Psychiatric disorder, PUD (peptic ulcer disease), Rheumatic fever, Seizures ([address] Sleep apnea, Stroke ([address] or Thromboembolus ([address] Mr. Danny Block  has a past surgical history that includes hx knee replacement (Right, 04/25/2017).   Social History/Living Environment:   Home Environment: Private residence  One/Two Story Residence: Two story, live on 1st floor  Living Alone: No  Support Systems: Child(gloria), Spouse/Significant Other/Partner  Patient Expects to be Discharged to[de-identified] Private residence  Current DME Used/Available at Home: andi Wiggins, [employer], bedside  Prior Level of Function/Work/Activity:  independent   Number of Personal Factors/Comorbidities that affect the Plan of Care: 1-2: MODERATE COMPLEXITY   EXAMINATION:   Most Recent Physical Functioning:                 LLE AROM  L Knee Flexion: 112  L Knee Extension: 4          Bed Mobility  Supine to Sit: Stand-by assistance    Transfers  Sit to Stand: Stand-by assistance  Stand to Sit: Stand-by assistance  Bed to Chair: Stand-by assistance    Balance  Sitting: Intact  Standing: Without support              Weight Bearing Status  Left Side Weight Bearing: As tolerated  Distance (ft): 234 Feet (ft)(x2)  Ambulation - Level of Assistance: Stand-by assistance  Assistive Device: Walker, rolling  Speed/Alexus: Delayed;Pace decreased (<100 feet/min)  Step Length: Left shortened;Right shortened  Stance: Left decreased  Gait Abnormalities: Antalgic;Decreased step clearance  Interventions: Safety awareness training;Verbal cues     Braces/Orthotics:     Left Knee Cold  Type: Cryocuff      Body Structures Involved:  1. Bones  2. Joints  3. Muscles  4. Ligaments Body Functions Affected:  1. Movement Related Activities and Participation Affected:  1. Mobility   Number of elements that affect the Plan of Care: 3: MODERATE COMPLEXITY   CLINICAL PRESENTATION:   Presentation: Stable and uncomplicated: LOW COMPLEXITY   CLINICAL DECISION MAKING:   [employer] [facility] 6 Clicks   Basic Mobility Inpatient Short Form  How much difficulty does the patient currently have. .. Unable A Lot A Little None   1. Turning over in bed (including adjusting bedclothes, sheets and blankets)? [] 1   [] 2   [x] 3   [] 4   2. Sitting down on and standing up from a chair with arms ( e.g., wheelchair, bedside commode, etc.)   [] 1   [] 2   [x] 3   [] 4   3. Moving from lying on back to sitting on the side of the bed? [] 1   [] 2   [x] 3   [] 4   How much help from another person does the patient currently need. .. Total A Lot A Little None   4. Moving to and from a bed to a chair (including a wheelchair)? [] 1   [] 2   [x] 3   [] 4   5. Need to walk in hospital room? [] 1   [x] 2   [] 3   [] 4   6. Climbing 3-5 steps with a railing?    [] 1   [x] 2   [] 3 [] 4   © 2007, Trustees of [address] [place] [address] [address] [address], under license to [employer]. All rights reserved        Score:  Initial: 16 Most Recent: X (Date: -- )    Interpretation of Tool:  Represents activities that are increasingly more difficult (i.e. Bed mobility, Transfers, Gait). Medical Necessity:     · Patient is expected to demonstrate progress in strength, range of motion and balance to decrease assistance required with theraputic exercises and functional mobility. Reason for Services/Other Comments:  · Patient continues to require present interventions due to patient's inability to perform theraputic exercises and functional mobility independently. Use of outcome tool(s) and clinical judgement create a POC that gives a: Clear prediction of patient's progress: LOW COMPLEXITY            TREATMENT:   (In addition to Assessment/Re-Assessment sessions the following treatments were rendered)     Pre-treatment Symptoms/Complaints:  none  Pain: Initial:      Post Session:  0     Gait Training (15 Minutes):  Gait training to improve and/or restore physical functioning as related to mobility, strength and balance. Ambulated 234 Feet (ft)(x2) with Stand-by assistance using a Walker, rolling and minimal Safety awareness training;Verbal cues related to their stance phase and stride length to promote proper body alignment, promote proper body posture and promote proper body mechanics. Therapeutic Exercise: (45 Minutes(group)):  Exercises per grid below to improve mobility and strength. Required minimal visual, verbal and manual cues to promote proper body alignment, promote proper body posture and promote proper body mechanics. Progressed range and repetitions as indicated.      Date:  3/12 Date:  3/13/19 Date:     ACTIVITY/EXERCISE AM PM AM PM AM PM   GROUP THERAPY  []  []  []  [x]  []  []   Ankle Pumps  10a 10 15     Quad Sets  10a 10 15     Gluteal Sets  10a 10 15     Hip ABd/ADduction  10a 10 15     Straight Leg Raises   10 15     Knee Slides  10a 10 15     Short Arc Quads    15     Long Arc Quads         Chair Slides    15              B = bilateral; AA = active assistive; A = active; P = passive      Treatment/Session Assessment:     Response to Treatment:  Pt doing well with mobility and exercises. Education:  [x] Home Exercises  [x] Fall Precautions  [] Hip Precautions [] D/C Instruction Review  [x] Knee/Hip Prosthesis Review  [x] Walker Management/Safety [] Adaptive Equipment as Needed       Interdisciplinary Collaboration:   o Registered Nurse    After treatment position/precautions:   o Up in chair  o Bed/Chair-wheels locked  o Bed in low position  o Caregiver at bedside  o Call light within reach    Compliance with Program/Exercises: Will assess as treatment progresses. No questions. Recommendations/Intent for next treatment session:  Treatment next visit will focus on increasing Mr. Mabrys independence with bed mobility, transfers, gait training, strength/ROM exercises, modalities for pain, and patient education.       Total Treatment Duration:  PT Patient Time In/Time Out  Time In: 1300  Time Out: 1400 [facility]

## 2019-03-13 NOTE — PROGRESS NOTES
03/12/19 2131   Oxygen Therapy   O2 Sat (%) 100 %   Pulse via Oximetry 112 beats per minute   O2 Device Room air   O2 Flow Rate (L/min) 0 l/min   Patient placed on continuous sat monitor. Alarms set and data cleared. No distress noted at this time.

## 2019-03-13 NOTE — DISCHARGE SUMMARY
[address] [place] [address]  Total Joint Discharge Summary      Patient ID:  Joyce Chandler  [id number]  89 y.o.  1955    Admit date: 3/12/2019  Discharge date and time: 3-13-19  Admitting Physician: Angelo León MD  Surgeon: Same  Admission Diagnoses: Unilateral primary osteoarthritis, left knee [M17.12]  Arthritis of knee, left [M17.12]  Discharge Diagnoses: Principal Problem: Total knee replacement status, left (3/13/2019)    Active Problems:    Arthritis of knee, left (3/12/2019)      Diabetes mellitus type 2, controlled ([facility] [address] (3/12/2019)      Hypertension (3/12/2019)      Hyperlipidemia (3/12/2019)                                Perioperative Antibiotics: Ancef 1 to 2 mg was given depending on patient's weight. If allergic to Ancef or due to other indications, patient was given Vancomycin. Hospital Medications given:   [unfilled]  [unfilled]  [unfilled]    Discharge Medications given:  Current Discharge Medication List      START taking these medications    Details   oxyCODONE IR (ROXICODONE) 5 mg immediate release tablet Take 1-2 Tabs by mouth every four (4) hours as needed for Pain for up to 7 days. Max Daily Amount: 60 mg.  Qty: 60 Tab, Refills: 0    Associated Diagnoses: Total knee replacement status, left         CONTINUE these medications which have CHANGED    Details   aspirin delayed-release 81 mg tablet Take 1 Tab by mouth every twelve (12) hours every twelve (12) hours for 30 days. Qty: 60 Tab, Refills: 0         CONTINUE these medications which have NOT CHANGED    Details   Cetirizine (ZYRTEC) 10 mg cap Take  by mouth as needed. losartan (COZAAR) 25 mg tablet Take 25 mg by mouth daily. cyanocobalamin, vitamin B-12, (VITAMIN B-12) 5,000 mcg subl by SubLINGual route daily. nicotinic acid (NIACIN) 500 mg tablet Take 500 mg by mouth nightly. folic acid 687 mcg tablet Take 400 mcg by mouth daily. amLODIPine (NORVASC) 5 mg tablet Take 5 mg by mouth nightly. Indications: hypertension      glyBURIDE-metFORMIN (GLUCOVANCE) 1. mg per tablet Take 1 Tab by mouth two (2) times a day. Takes in morning and at 7 pm   Indications: type 2 diabetes mellitus      levothyroxine (SYNTHROID) 88 mcg tablet Take  by mouth Daily (before breakfast). Take / use AM day of surgery  per anesthesia protocols. Indications: hypothyroidism         STOP taking these medications       vit C/E/Zn/coppr/lutein/zeaxan (PRESERVISION AREDS-2 PO) Comments:   Reason for Stopping:                Additional DVT Prophylaxis:  DWIGHT Hose,Plexi-Pulse    Postoperative transfusions:   none  Post Op complications: none    Hemoglobin at discharge:   Lab Results   Component Value Date/Time    HGB 13.1 (L) 03/12/2019 08:00 PM       Wound appears to be healing without any evidence of infection. Physical Therapy started on the day following surgery and progressed to independent ambulation with the aid of a walker. At the time of discharge, able to go up and down stairs and had understanding of precautions needed following surgery.       PT/OT:            Assistive Device: Walker (comment)        LLE AROM  L Knee Flexion: 75  L Knee Extension: 10       Discharged to: home    Discharge instructions:  -Rx pain medication given  - Anticoagulate with: Ecotrin 81 mg PO BID x 4 weeks  -Resume pre hospital diet             -Resume home medications per medical continuation form     -Ambulate with walker, appropriate total joint protocol  -Follow up in office as scheduled       Signed:  VANESSA Rebolledo  3/13/2019  7:38 AM

## 2019-03-13 NOTE — PROGRESS NOTES
Problem: Self Care Deficits Care Plan (Adult)  Goal: *Acute Goals and Plan of Care (Insert Text)  GOALS:   DISCHARGE GOALS (in preparation for going home/rehab):  3 days  1. Mr. Arvin Denver will perform one lower body dressing activity with minimal assistance required to demonstrate improved functional mobility and safety. GOAL MET 3/13/2019  2. Mr. Arvin Denver will perform one lower body bathing activity with minimal assistance required to demonstrate improved functional mobility and safety. GOAL MET 3/13/2019  3. Mr. Arvin Denver will perform toileting/toilet transfer with contact guard assistance to demonstrate improved functional mobility and safety. GOAL MET 3/13/2019  4. Mr. Arvin Denver will perform shower transfer with contact guard assistance to demonstrate improved functional mobility and safety. GOAL MET 3/13/2019    JOINT CAMP OCCUPATIONAL THERAPY TKA: Daily Note, Discharge and AM 3/13/2019  INPATIENT: Hospital Day: 2  [place] / [place] / Product Type: Medicare /      NAME/AGE/GENDER: Vasquez Gimenez is a 70 y.o. male   PRIMARY DIAGNOSIS:  Unilateral primary osteoarthritis, left knee [M17.12]   Procedure(s) and Anesthesia Type:     * KNEE ARTHROPLASTY TOTAL/ LEFT - Spinal (Left)  ICD-10: Treatment Diagnosis:    · Pain in Left Knee (M25.562)  · Stiffness of Left Knee, Not elsewhere classified (B40.034)      ASSESSMENT:      Mr. Arvin Denver is s/p leftTKA and presents with decreased weight bearing on  Left and decreased independence with functional mobility and activities of daily living. Patient completed shower and dressing as charter below in ADL grid and is ambulating with rolling walker and stand by assist.  Patient has met 4/4 goals and plans to return home with good family support. Family able to provide patient with appropriate level of assistance at this time. OT reviewed safety precautions throughout session and therapy schedule for the remainder of today.   Patient instructed to call for assistance when needing to get up from recliner and all needs in reach. Patient verbalized understanding of call light. This section established at most recent assessment   PROBLEM LIST (Impairments causing functional limitations):  1. Decreased Strength  2. Decreased ADL/Functional Activities  3. Decreased Transfer Abilities  4. Increased Pain  5. Increased Fatigue  6. Decreased Flexibility/Joint Mobility  7. Decreased Knowledge of Precautions   INTERVENTIONS PLANNED: (Benefits and precautions of occupational therapy have been discussed with the patient.)  1. Activities of daily living training  2. Adaptive equipment training  3. Balance training  4. Clothing management  5. Donning&doffing training  6. Theraputic activity     TREATMENT PLAN: Frequency/Duration: Follow patient 1-2 times to address above goals. Rehabilitation Potential For Stated Goals: Good     RECOMMENDED REHABILITATION/EQUIPMENT: (at time of discharge pending progress): Continue Skilled Therapy and Home Health: Physical Therapy. OCCUPATIONAL PROFILE AND HISTORY:   History of Present Injury/Illness (Reason for Referral): Pt presents this date s/p (left) TKA. Past Medical History/Comorbidities:   Mr. Susanne Cormier  has a past medical history of Diabetes ([address] Hypertension, Hypothyroidism, and Osteoarthritis (4/25/2017). He also has no past medical history of Aneurysm ([address] Arrhythmia, Asthma, Autoimmune disease ([address] CAD (coronary artery disease), Cancer ([address] Chronic kidney disease, Chronic obstructive pulmonary disease ([address] Chronic pain, Coagulation disorder ([address] Endocarditis, GERD (gastroesophageal reflux disease), Heart failure ([address] Liver disease, Morbid obesity ([address] Psychiatric disorder, PUD (peptic ulcer disease), Rheumatic fever, Seizures ([address] Sleep apnea, Stroke ([address] or Thromboembolus ([address] Mr. Susanne Cormier  has a past surgical history that includes hx knee replacement (Right, 04/25/2017).   Social History/Living Environment:   Home Environment: Private residence  One/Two Story Residence: Two story, live on 1st floor  Living Alone: No  Support Systems: Child(gloria), Spouse/Significant Other/Partner  Patient Expects to be Discharged to[de-identified] Private residence  Current DME Used/Available at Home: andi Crowder, [employer], bedside  Prior Level of Function/Work/Activity:  Independent      Number of Personal Factors/Comorbidities that affect the Plan of Care: Brief history (0):  LOW COMPLEXITY   ASSESSMENT OF OCCUPATIONAL PERFORMANCE[de-identified]   Most Recent Physical Functioning:   Balance  Sitting: Intact  Standing: Without support                              Mental Status  Neurologic State: Alert; Appropriate for age  Orientation Level: Appropriate for age  Cognition: Appropriate decision making; Appropriate for age attention/concentration; Appropriate safety awareness; Follows commands  Perception: Appears intact  Perseveration: No perseveration noted  Safety/Judgement: Awareness of environment; Fall prevention                Basic ADLs (From Assessment) Complex ADLs (From Assessment)   Basic ADL  Feeding: Independent  Oral Facial Hygiene/Grooming: Supervision  Bathing: Moderate assistance  Type of Bath: Chlorhexidine (CHG), Shower  Upper Body Dressing: Supervision  Lower Body Dressing: Moderate assistance  Toileting: Moderate assistance     Grooming/Bathing/Dressing Activities of Daily Living   Grooming  Grooming Assistance: Supervision/set up  Washing Face: Supervision/set-up  Washing Hands: Supervision/set-up  Brushing Teeth: Supervision/set-up Cognitive Retraining  Safety/Judgement: Awareness of environment; Fall prevention   Upper Body Bathing  Bathing Assistance: Supervision/set-up  Position Performed: Standing  Adaptive Equipment: Grab bar     Lower Body Bathing  Bathing Assistance: Stand-by assistance  Perineal  : Stand-by assistance  Position Performed: Standing  Lower Body : Stand-by assistance  Position Performed: Seated in chair;Standing  Adaptive Equipment: Grab bar;Tub bench Toileting  Toileting Assistance: Stand-by assistance  Bladder Hygiene: Stand-by assistance  Clothing Management: Stand-by assistance  Adaptive Equipment: Elevated seat;Walker   Upper Body Dressing Assistance  Dressing Assistance: Pily 77 Gown: Supervision/ set-up  Pullover Shirt: Supervision/set-up Functional Transfers  Bathroom Mobility: Stand-by assistance  Toilet Transfer : Stand-by assistance  Shower Transfer: Stand-by assistance  Adaptive Equipment: Grab bars; Tub transfer bench   Lower Body Dressing Assistance  Dressing Assistance: Minimum assistance  Underpants: Stand-by assistance  Pants With Button/Zipper: Stand-by assistance  Socks: Minimum assistance           Physical Skills Involved:  1. Range of Motion  2. Balance  3. Strength Cognitive Skills Affected (resulting in the inability to perform in a timely and safe manner): 1. none Psychosocial Skills Affected:  1. Environmental Adaptation   Number of elements that affect the Plan of Care: 3-5:  MODERATE COMPLEXITY   CLINICAL DECISION MAKING:   [facility] AM-PAC 6 Clicks   Daily Activity Inpatient Short Form  How much help from another person does the patient currently need. .. Total A Lot A Little None   1. Putting on and taking off regular lower body clothing? [] 1   [] 2   [x] 3   [] 4   2. Bathing (including washing, rinsing, drying)? [] 1   [] 2   [x] 3   [] 4   3. Toileting, which includes using toilet, bedpan or urinal?   [] 1   [] 2   [] 3   [x] 4   4. Putting on and taking off regular upper body clothing? [] 1   [] 2   [] 3   [x] 4   5. Taking care of personal grooming such as brushing teeth? [] 1   [] 2   [] 3   [x] 4   6. Eating meals? [] 1   [] 2   [] 3   [x] 4   © 2007, Trustees of [facility], under license to [employer].  All rights reserved     Score:  Initial: 20 Most Recent: 22 discharge 3/13/19    Interpretation of Tool: Represents activities that are increasingly more difficult (i.e. Bed mobility, Transfers, Gait). ·     Use of outcome tool(s) and clinical judgement create a POC that gives a: LOW COMPLEXITY            TREATMENT:   (In addition to Assessment/Re-Assessment sessions the following treatments were rendered)     Pre-treatment Symptoms/Complaints:  Pt up to edge of bed without complaint of pain   Pain: Initial:   Pain Intensity 1: 0 shower Post Session:  0/10 iceman in place     Self Care: (32): Procedure(s) (per grid) utilized to improve and/or restore self-care/home management as related to dressing, bathing, toileting and grooming. Required minimal visual, verbal and tactile cueing to facilitate activities of daily living skills and compensatory activities. Treatment/Session Assessment:     Response to Treatment:  Pt tolerated well, may go home today. Education:  [] Home Exercises  [x] Fall Precautions  [] Hip Precautions [] Going Home Video  [x] Knee/Hip Prosthesis Review  [x] Walker Management/Safety [x] Adaptive Equipment as Needed       Interdisciplinary Collaboration:   o Physical Therapy Assistant  o Occupational Therapist  o Registered Nurse    After treatment position/precautions:   o Up in chair  o Bed/Chair-wheels locked  o Call light within reach  o RN notified  o Family at bedside     Compliance with Program/Exercises: Compliant all of the time. Recommendations/Intent for next treatment session:  Pt doing well all goals met and will do well at home with support from spouse. Patient will be discharged home with home health PT. No further Occupational Therapy warranted, will discharge Occupational Therapy services.       Total Treatment Duration:32  OT Patient Time In/Time Out  Time In: 3972  Time Out: Breanne 40, OT

## 2019-03-13 NOTE — PROGRESS NOTES
Orthopedic Joint Progress Note    2019  Admit Date: 3/12/2019  Admit Diagnosis: Unilateral primary osteoarthritis, left knee [M17.12]  Arthritis of knee, left [M17.12]    1 Day Post-Op    Subjective:     Vasquez Gimenez awake and alert    Review of Systems: Pertinent items are noted in HPI. Objective:     PT/OT:     PATIENT MOBILITY    Bed Mobility  Supine to Sit: Contact guard assistance  Transfers  Sit to Stand: Minimum assistance  Stand to Sit: Minimum assistance  Bed to Chair: Minimum assistance      Gait  Speed/Alexus: Delayed  Step Length: Left shortened, Right shortened  Stance: Left decreased  Gait Abnormalities: Antalgic, Decreased step clearance  Ambulation - Level of Assistance: Minimal assistance  Distance (ft): 4 Feet (ft)  Assistive Device: Walker, rolling  Interventions: Safety awareness training, Verbal cues   Weight Bearing Status  Left Side Weight Bearing: As tolerated        Vital Signs:    Blood pressure 127/63, pulse 73, temperature 97.6 °F (36.4 °C), resp. rate 17, height 5' 8\" (1.727 m), weight 95.3 kg (209 lb 16 oz), SpO2 97 %.   Temp (24hrs), Av.7 °F (36.5 °C), Min:97.3 °F (36.3 °C), Max:98 °F (36.7 °C)      Pain Control:   Pain Assessment  Pain Scale 1: Numeric (0 - 10)  Pain Intensity 1: 0  Pain Onset 1: years ago  Pain Location 1: Knee  Pain Orientation 1: Left  Pain Description 1: Aching, Sharp, Dull, Sore    Meds:  Current Facility-Administered Medications   Medication Dose Route Frequency    tuberculin injection 5 Units  5 Units IntraDERMal ONCE    amLODIPine (NORVASC) tablet 5 mg  5 mg Oral QHS    loratadine (CLARITIN) tablet 10 mg  10 mg Oral DAILY PRN    levothyroxine (SYNTHROID) tablet 88 mcg  88 mcg Oral ACB    losartan (COZAAR) tablet 25 mg  25 mg Oral DAILY    0.9% sodium chloride infusion  125 mL/hr IntraVENous CONTINUOUS    sodium chloride (NS) flush 5-40 mL  5-40 mL IntraVENous Q8H    sodium chloride (NS) flush 5-40 mL  5-40 mL IntraVENous PRN    acetaminophen (TYLENOL) tablet 1,000 mg  1,000 mg Oral Q6H    celecoxib (CELEBREX) capsule 200 mg  200 mg Oral Q12H    oxyCODONE IR (ROXICODONE) tablet 5-10 mg  5-10 mg Oral Q4H PRN    HYDROmorphone (PF) (DILAUDID) injection 1 mg  1 mg IntraVENous Q3H PRN    naloxone (NARCAN) injection 0.2-0.4 mg  0.2-0.4 mg IntraVENous Q10MIN PRN    dexamethasone (DECADRON) injection 10 mg  10 mg IntraVENous ONCE    promethazine (PHENERGAN) tablet 25 mg  25 mg Oral Q6H PRN    diphenhydrAMINE (BENADRYL) capsule 25 mg  25 mg Oral Q4H PRN    senna-docusate (PERICOLACE) 8.6-50 mg per tablet 2 Tab  2 Tab Oral DAILY    zolpidem (AMBIEN) tablet 5 mg  5 mg Oral QHS PRN    aspirin delayed-release tablet 81 mg  81 mg Oral Q12H    ondansetron (ZOFRAN ODT) tablet 8 mg  8 mg Oral Q8H PRN    alcohol 62% (NOZIN) nasal  1 Ampule  1 Ampule Topical Q12H    glyBURIDE/metFORMIN (GLUCOVANCE) 1.25/250 mg   Oral BID WITH MEALS    insulin lispro (HUMALOG) injection   SubCUTAneous AC&HS        LAB:    Lab Results   Component Value Date/Time    INR 1.1 02/19/2019 09:20 AM    INR 1.1 04/03/2017 09:26 AM     Lab Results   Component Value Date/Time    HGB 13.1 (L) 03/12/2019 08:00 PM    HGB 13.5 (L) 02/19/2019 09:20 AM    HGB 11.4 (L) 04/26/2017 04:20 AM       Wound Knee Right (Active)   Number of days: 687       Wound Knee Left (Active)   Dressing Status  Clean, dry, and intact 3/12/2019  8:00 PM   Dressing Type  Aquacel 3/12/2019  8:00 PM   Number of days: 1         Physical Exam:  Calves soft/ neuro intact      Assessment:      Principal Problem:     Total knee replacement status, left (3/13/2019)    Active Problems:    Arthritis of knee, left (3/12/2019)      Diabetes mellitus type 2, controlled ([facility] [address] (3/12/2019)      Hypertension (3/12/2019)      Hyperlipidemia (3/12/2019)         Plan:     Continue PT/OT/Rehab  Consult: Rehab team including PT, OT, recreational therapy, and    Home soon      Patient Expects to be Discharged to[de-identified] Private residence     Signed By: Lord Elias MD

## 2019-03-13 NOTE — PHYSICIAN ADVISORY
Letter of Determination: Upgrade from Observation to Inpatient Status    This patient was originally hospitalized as Outpatient Status with Observation Services on 3/12/2019 for scheduled left total knee arthroplasty. This patient now meets for Inpatient Admission in accordance with CMS regulation Section 43 .3. Specifically, patient's stay is now over Two Midnights and was medically necessary. The patient's stay was medically necessary based on age > 72, diabetes mellitus type 2, and hypertension. Consistent with CMS guidelines, patient meets for inpatient status. It is our recommendation that this patient's hospitalization status should be upgraded from OBSERVATION to INPATIENT status.      The final decision regarding the patient's hospitalization status depends on the attending physician's judgement.     Guanako Atkins MD, DENIS,   Physician Axel La.

## 2019-03-13 NOTE — CONSULTS
HOSPITALIST H&P/CONSULT  NAME:  Pushpa Fletcher   Age:  70 y.o.  :   1955   MRN:   [id number]  PCP: Dimitri Villegas, Not On File  Consulting MD:  Treatment Team: Attending Provider: Thomas Mora MD; Consulting Provider: Christi Loving DO; Utilization Review: Megan Black RN; Care Manager: CRISSY StephensW  HPI:   Patient is a pleasant 89K with pmhx of HTN, NIDDM, hypothyroidism who is admitted to the hospital for left TKA. He has does well postoperatively and is found sitting in bedside chair. Family in chair. Denies any ercent systemic complaints - no chest pain, nausea, vomiting, fever, chills. Denies pain from surgery at this time. Complete ROS done and is as stated in HPI or otherwise negative  Past Medical History:   Diagnosis Date    Diabetes ([facility] [address]     Controlled with medication, last a1c 6.6 in 2018, patient does not check sugar daily, no problems with hypoglycemia     Hypertension     controlled with medication     Hypothyroidism     controlled with medication     Osteoarthritis 2017      Past Surgical History:   Procedure Laterality Date    HX KNEE REPLACEMENT Right 2017      Prior to Admission Medications   Prescriptions Last Dose Informant Patient Reported? Taking? Cetirizine (ZYRTEC) 10 mg cap 3/9/2019  Yes Yes   Sig: Take  by mouth as needed. amLODIPine (NORVASC) 5 mg tablet 3/11/2019 at Unknown time  Yes Yes   Sig: Take 5 mg by mouth nightly. Indications: hypertension   aspirin delayed-release 81 mg tablet 3/11/2019 at Unknown time  Yes Yes   Sig: Take  by mouth nightly. cyanocobalamin, vitamin B-12, (VITAMIN B-12) 5,000 mcg subl 3/5/2019 at Unknown time  Yes Yes   Sig: by SubLINGual route daily. folic acid 415 mcg tablet 3/5/2019 at Unknown time  Yes Yes   Sig: Take 400 mcg by mouth daily. glyBURIDE-metFORMIN (GLUCOVANCE) 1. mg per tablet 3/11/2019 at Unknown time  Yes Yes   Sig: Take 1 Tab by mouth two (2) times a day.  Takes in morning and at 7 pm   Indications: type 2 diabetes mellitus   levothyroxine (SYNTHROID) 88 mcg tablet 3/12/2019 at Unknown time  Yes Yes   Sig: Take  by mouth Daily (before breakfast). Take / use AM day of surgery  per anesthesia protocols. Indications: hypothyroidism   losartan (COZAAR) 25 mg tablet 3/11/2019 at Unknown time  Yes Yes   Sig: Take 25 mg by mouth daily. nicotinic acid (NIACIN) 500 mg tablet 3/5/2019 at Unknown time  Yes Yes   Sig: Take 500 mg by mouth nightly. vit C/E/Zn/coppr/lutein/zeaxan (PRESERVISION AREDS-2 PO) 3/5/2019 at Unknown time  Yes Yes   Sig: Take  by mouth two (2) times a day. Facility-Administered Medications: None     No Known Allergies   Social History     Tobacco Use    Smoking status: Never Smoker    Smokeless tobacco: Never Used   Substance Use Topics    Alcohol use: No      Family History   Problem Relation Age of Onset    Stroke Mother     Heart Disease Father       Objective:     Visit Vitals  /79 (BP 1 Location: Right arm, BP Patient Position: Sitting)   Pulse (!) 110 Comment: RN notified   Temp 97.6 °F (36.4 °C)   Resp 16   Ht 5' 8\" (1.727 m)   Wt 95.3 kg (209 lb 16 oz)   SpO2 100%   BMI 31.93 kg/m²      Temp (24hrs), Av.7 °F (36.5 °C), Min:97.3 °F (36.3 °C), Max:98 °F (36.7 °C)    Oxygen Therapy  O2 Sat (%): 100 % (19)  Pulse via Oximetry: 112 beats per minute (19)  O2 Device: Room air (19)  O2 Flow Rate (L/min): 0 l/min (19)  Physical Exam:  General:    Alert, cooperative, no distress, appears stated age. Head:   Normocephalic, without obvious abnormality, atraumatic. Nose:  Nares normal. No drainage or sinus tenderness. Lungs:   Clear to auscultation bilaterally. No Wheezing or Rhonchi. No rales. Heart:   Regular rate and rhythm,  no murmur, rub or gallop. Abdomen:   Soft, non-tender. Not distended. Bowel sounds normal.   Extremities: No cyanosis. No edema. No clubbing.  Pedal pulses intact, wiggles toes bilaterally  Skin:     Texture, turgor normal. No rashes or lesions. Not Jaundiced  Neurologic: Alert and oriented x 3, no focal deficits   Data Review:   Recent Results (from the past 24 hour(s))   TYPE & SCREEN    Collection Time: 03/12/19  8:32 AM   Result Value Ref Range    Crossmatch Expiration 03/15/2019     ABO/Rh(D) A POSITIVE     Antibody screen NEG    GLUCOSE, POC    Collection Time: 03/12/19  8:38 AM   Result Value Ref Range    Glucose (POC) 132 (H) 65 - 100 mg/dL   HEMOGLOBIN    Collection Time: 03/12/19  8:00 PM   Result Value Ref Range    HGB 13.1 (L) 13.6 - 17.2 g/dL   GLUCOSE, POC    Collection Time: 03/12/19  8:31 PM   Result Value Ref Range    Glucose (POC) 275 (H) 65 - 100 mg/dL     Imaging /Procedures /Studies     Assessment and Plan:      Active Hospital Problems    Diagnosis Date Noted    Arthritis of knee, left 03/12/2019    Diabetes mellitus type 2, controlled ([address] 03/12/2019    Hypertension 03/12/2019    Hyperlipidemia 03/12/2019       PLAN  ·  Home medications resumed as appropriate  · Added SSI to home oral DM regimen  · Continue current dose synthroid and norvasc  · No changes in mgmt for chronic medical issues needed at this time    Thank you for this consult, hospitalist will sign off    Signed By: Javier Boggs DO     March 12, 2019

## 2019-03-13 NOTE — PROGRESS NOTES
2019         Post Op day: 1 Day Post-Op     Admit Date: 3/12/2019  Admit Diagnosis: Unilateral primary osteoarthritis, left knee [M17.12]  Arthritis of knee, left [M17.12]        Subjective: Doing well, No complaints, No SOB, No Chest Pain, No Nausea or Vomiting     Objective:   Vital Signs are Stable, No Acute Distress, Alert and Oriented, Dressing is Dry,  Neurovascular exam is normal.     Assessment / Plan :  Patient Active Problem List   Diagnosis Code    Osteoarthritis M19.90    S/P total knee arthroplasty Z96.659    Arthritis of knee, left M17.12    Diabetes mellitus type 2, controlled ([facility] E11.9    Hypertension I10    Hyperlipidemia E78.5    Total knee replacement status, left K84.162      Patient Vitals for the past 8 hrs:   BP Temp Pulse Resp SpO2   19 0438 127/63 97.6 °F (36.4 °C) 73 17 97 %   19 0008 125/73 97.5 °F (36.4 °C) 100 17 97 %    Temp (24hrs), Av.7 °F (36.5 °C), Min:97.3 °F (36.3 °C), Max:98 °F (36.7 °C)    Body mass index is 31.93 kg/m².     Lab Results   Component Value Date/Time    HGB 13.1 (L) 2019 08:00 PM      Pt seen by and discussed with [place] today       Signed By: VANESSA Bright

## 2019-03-13 NOTE — PROGRESS NOTES
Problem: Mobility Impaired (Adult and Pediatric)  Goal: *Acute Goals and Plan of Care (Insert Text)  GOALS (1-4 days):  (1.)Mr. Nahum Alba will move from supine to sit and sit to supine  in bed with STAND BY ASSIST.  (2.)Mr. Nahum Alba will transfer from bed to chair and chair to bed with STAND BY ASSIST using the least restrictive device. MET 3/13/19  (3.)Mr. Nahum Alba will ambulate with STAND BY ASSIST for 150 feet with the least restrictive device. (4.)Mr. Nahum Alba will ambulate up/down 2 steps with bilateral  railing with CONTACT GUARD ASSIST with no device. (5.)Mr. Nahum Alba will increase left knee ROM to 5°-80°.  ________________________________________________________________________________________________      PHYSICAL THERAPY [facility]: Daily Note, AM 3/13/2019  INPATIENT: Hospital Day: 2  [place] / [place] / Product Type: Medicare /      NAME/AGE/GENDER: Joyce Chandler is a 70 y.o. male   PRIMARY DIAGNOSIS:  Unilateral primary osteoarthritis, left knee [M17.12]   Procedure(s) and Anesthesia Type:     * KNEE ARTHROPLASTY TOTAL/ LEFT - Spinal (Left)  ICD-10: Treatment Diagnosis:    · Pain in Left Knee (M25.562)  · Stiffness of Left Knee, Not elsewhere classified (M25.662)  · Difficulty in walking, Not elsewhere classified (R26.2)      ASSESSMENT:     Mr. Nahum Alba is supine in bed on arrival.  He is agreeable to PT and plans to go home today with HHPT. Pt is moving well. This section established at most recent assessment   PROBLEM LIST (Impairments causing functional limitations):  1. Decreased Strength  2. Decreased ADL/Functional Activities  3. Decreased Transfer Abilities  4. Decreased Ambulation Ability/Technique  5. Decreased Balance  6. Increased Pain  7. Decreased Activity Tolerance  8. Decreased Flexibility/Joint Mobility  9.  Decreased [place] with Home Exercise Program   INTERVENTIONS PLANNED: (Benefits and precautions of physical therapy have been discussed with the patient.)  1. Bed Mobility  2. Gait Training  3. Home Exercise Program (HEP)  4. Therapeutic Exercise/Strengthening  5. Transfer Training  6. Range of Motion: active/assisted/passive  7. Therapeutic Activities  8. Group Therapy     TREATMENT PLAN: Frequency/Duration: Follow patient BID for duration of hospital stay to address above goals. Rehabilitation Potential For Stated Goals: Good     RECOMMENDED REHABILITATION/EQUIPMENT: (at time of discharge pending progress): Continue Skilled Therapy and Home Health: Physical Therapy. HISTORY:   History of Present Injury/Illness (Reason for Referral):  S/p left tka  Past Medical History/Comorbidities:   Mr. Dee Isabel  has a past medical history of Diabetes ([address] Hypertension, Hypothyroidism, and Osteoarthritis (4/25/2017). He also has no past medical history of Aneurysm ([address] Arrhythmia, Asthma, Autoimmune disease ([address] CAD (coronary artery disease), Cancer ([address] Chronic kidney disease, Chronic obstructive pulmonary disease ([address] Chronic pain, Coagulation disorder ([address] Endocarditis, GERD (gastroesophageal reflux disease), Heart failure ([address] Liver disease, Morbid obesity ([address] Psychiatric disorder, PUD (peptic ulcer disease), Rheumatic fever, Seizures ([address] Sleep apnea, Stroke ([address] or Thromboembolus ([address] Mr. Dee Isabel  has a past surgical history that includes hx knee replacement (Right, 04/25/2017).   Social History/Living Environment:   Home Environment: Private residence  One/Two Story Residence: Two story, live on 1st floor  Living Alone: No  Support Systems: Child(gloria), Spouse/Significant Other/Partner  Patient Expects to be Discharged to[de-identified] Private residence  Current DME Used/Available at Home: andi Sibley, [employer], bedside  Prior Level of Function/Work/Activity:  independent   Number of Personal Factors/Comorbidities that affect the Plan of Care: 1-2: MODERATE COMPLEXITY   EXAMINATION:   Most Recent Physical Functioning: Bed Mobility  Supine to Sit: Stand-by assistance    Transfers  Sit to Stand: Stand-by assistance  Stand to Sit: Stand-by assistance  Bed to Chair: Stand-by assistance    Balance  Sitting: Intact  Standing: Without support              Weight Bearing Status  Left Side Weight Bearing: As tolerated  Distance (ft): 40 Feet (ft)  Ambulation - Level of Assistance: Stand-by assistance  Assistive Device: Walker, rolling  Speed/Alexus: Delayed;Pace decreased (<100 feet/min)  Step Length: Left shortened;Right shortened  Stance: Left decreased  Gait Abnormalities: Antalgic;Decreased step clearance  Interventions: Safety awareness training;Verbal cues     Braces/Orthotics:     Left Knee Cold  Type: Cryocuff      Body Structures Involved:  1. Bones  2. Joints  3. Muscles  4. Ligaments Body Functions Affected:  1. Movement Related Activities and Participation Affected:  1. Mobility   Number of elements that affect the Plan of Care: 3: MODERATE COMPLEXITY   CLINICAL PRESENTATION:   Presentation: Stable and uncomplicated: LOW COMPLEXITY   CLINICAL DECISION MAKING:   [facility] AM-PAC 6 Clicks   Basic Mobility Inpatient Short Form  How much difficulty does the patient currently have. .. Unable A Lot A Little None   1. Turning over in bed (including adjusting bedclothes, sheets and blankets)? [] 1   [] 2   [x] 3   [] 4   2. Sitting down on and standing up from a chair with arms ( e.g., wheelchair, bedside commode, etc.)   [] 1   [] 2   [x] 3   [] 4   3. Moving from lying on back to sitting on the side of the bed? [] 1   [] 2   [x] 3   [] 4   How much help from another person does the patient currently need. .. Total A Lot A Little None   4. Moving to and from a bed to a chair (including a wheelchair)? [] 1   [] 2   [x] 3   [] 4   5. Need to walk in hospital room? [] 1   [x] 2   [] 3   [] 4   6. Climbing 3-5 steps with a railing?    [] 1   [x] 2   [] 3   [] 4   © 2007, Trustees of [facility], under license to [email or web address]. All rights reserved        Score:  Initial: 16 Most Recent: X (Date: -- )    Interpretation of Tool:  Represents activities that are increasingly more difficult (i.e. Bed mobility, Transfers, Gait). Medical Necessity:     · Patient is expected to demonstrate progress in strength, range of motion and balance to decrease assistance required with theraputic exercises and functional mobility. Reason for Services/Other Comments:  · Patient continues to require present interventions due to patient's inability to perform theraputic exercises and functional mobility independently. Use of outcome tool(s) and clinical judgement create a POC that gives a: Clear prediction of patient's progress: LOW COMPLEXITY            TREATMENT:   (In addition to Assessment/Re-Assessment sessions the following treatments were rendered)     Pre-treatment Symptoms/Complaints:  none  Pain: Initial:      Post Session:  0     Gait Training (10 Minutes):  Gait training to improve and/or restore physical functioning as related to mobility, strength and balance. Ambulated 40 Feet (ft) with Stand-by assistance using a Walker, rolling and minimal Safety awareness training;Verbal cues related to their stance phase and stride length to promote proper body alignment, promote proper body posture and promote proper body mechanics. Therapeutic Exercise: (15 Minutes):  Exercises per grid below to improve mobility and strength. Required minimal visual, verbal and manual cues to promote proper body alignment, promote proper body posture and promote proper body mechanics. Progressed range and repetitions as indicated.      Date:  3/12 Date:  3/13/19 Date:     ACTIVITY/EXERCISE AM PM AM PM AM PM   GROUP THERAPY  []  []  []  []  []  []   Ankle Pumps  10a 10      Quad Sets  10a 10      Gluteal Sets  10a 10      Hip ABd/ADduction  10a 10      Straight Leg Raises   10      Knee Slides  10a 10      Short Arc Quads         Long Arc Quads         Chair Slides                  B = bilateral; AA = active assistive; A = active; P = passive      Treatment/Session Assessment:     Response to Treatment:  Pt doing well with mobility and exercises. Education:  [x] Home Exercises  [x] Fall Precautions  [] Hip Precautions [] D/C Instruction Review  [x] Knee/Hip Prosthesis Review  [x] Walker Management/Safety [] Adaptive Equipment as Needed       Interdisciplinary Collaboration:   o Registered Nurse    After treatment position/precautions:   o Up in chair  o Bed/Chair-wheels locked  o Bed in low position  o Caregiver at bedside  o Call light within reach    Compliance with Program/Exercises: Will assess as treatment progresses. No questions. Recommendations/Intent for next treatment session:  Treatment next visit will focus on increasing Mr. Mabrys independence with bed mobility, transfers, gait training, strength/ROM exercises, modalities for pain, and patient education.       Total Treatment Duration:  PT Patient Time In/Time Out  Time In: 0950  Time Out: 0915    Anne Marie Apple, PTA

## 2019-03-14 ENCOUNTER — HOME CARE VISIT (OUTPATIENT)
Dept: SCHEDULING | Facility: HOME HEALTH | Age: 64
End: 2019-03-14
Payer: MEDICARE

## 2019-03-14 VITALS
HEART RATE: 80 BPM | RESPIRATION RATE: 18 BRPM | TEMPERATURE: 97.7 F | SYSTOLIC BLOOD PRESSURE: 138 MMHG | DIASTOLIC BLOOD PRESSURE: 74 MMHG

## 2019-03-14 PROCEDURE — G0151 HHCP-SERV OF PT,EA 15 MIN: HCPCS

## 2019-03-14 PROCEDURE — 3331090002 HH PPS REVENUE DEBIT

## 2019-03-14 PROCEDURE — 400013 HH SOC

## 2019-03-14 PROCEDURE — 3331090001 HH PPS REVENUE CREDIT

## 2019-03-15 PROCEDURE — 3331090002 HH PPS REVENUE DEBIT

## 2019-03-15 PROCEDURE — 3331090001 HH PPS REVENUE CREDIT

## 2019-03-16 PROCEDURE — 3331090002 HH PPS REVENUE DEBIT

## 2019-03-16 PROCEDURE — 3331090001 HH PPS REVENUE CREDIT

## 2019-03-17 PROCEDURE — 3331090002 HH PPS REVENUE DEBIT

## 2019-03-17 PROCEDURE — 3331090001 HH PPS REVENUE CREDIT

## 2019-03-18 ENCOUNTER — HOME CARE VISIT (OUTPATIENT)
Dept: SCHEDULING | Facility: HOME HEALTH | Age: 64
End: 2019-03-18
Payer: MEDICARE

## 2019-03-18 VITALS
TEMPERATURE: 98.1 F | DIASTOLIC BLOOD PRESSURE: 72 MMHG | HEART RATE: 78 BPM | RESPIRATION RATE: 16 BRPM | SYSTOLIC BLOOD PRESSURE: 150 MMHG

## 2019-03-18 PROCEDURE — G0157 HHC PT ASSISTANT EA 15: HCPCS

## 2019-03-18 PROCEDURE — 3331090002 HH PPS REVENUE DEBIT

## 2019-03-18 PROCEDURE — 3331090001 HH PPS REVENUE CREDIT

## 2019-03-19 PROCEDURE — 3331090002 HH PPS REVENUE DEBIT

## 2019-03-19 PROCEDURE — 3331090001 HH PPS REVENUE CREDIT

## 2019-03-20 ENCOUNTER — HOME CARE VISIT (OUTPATIENT)
Dept: SCHEDULING | Facility: HOME HEALTH | Age: 64
End: 2019-03-20
Payer: MEDICARE

## 2019-03-20 VITALS
TEMPERATURE: 98.2 F | HEART RATE: 91 BPM | SYSTOLIC BLOOD PRESSURE: 138 MMHG | RESPIRATION RATE: 16 BRPM | DIASTOLIC BLOOD PRESSURE: 70 MMHG

## 2019-03-20 PROCEDURE — G0157 HHC PT ASSISTANT EA 15: HCPCS

## 2019-03-20 PROCEDURE — 3331090001 HH PPS REVENUE CREDIT

## 2019-03-20 PROCEDURE — 3331090002 HH PPS REVENUE DEBIT

## 2019-03-21 PROCEDURE — 3331090002 HH PPS REVENUE DEBIT

## 2019-03-21 PROCEDURE — 3331090001 HH PPS REVENUE CREDIT

## 2019-03-22 ENCOUNTER — HOME CARE VISIT (OUTPATIENT)
Dept: SCHEDULING | Facility: HOME HEALTH | Age: 64
End: 2019-03-22
Payer: MEDICARE

## 2019-03-22 VITALS
SYSTOLIC BLOOD PRESSURE: 128 MMHG | DIASTOLIC BLOOD PRESSURE: 80 MMHG | TEMPERATURE: 97.5 F | HEART RATE: 91 BPM | RESPIRATION RATE: 16 BRPM

## 2019-03-22 PROCEDURE — 3331090001 HH PPS REVENUE CREDIT

## 2019-03-22 PROCEDURE — G0157 HHC PT ASSISTANT EA 15: HCPCS

## 2019-03-22 PROCEDURE — 3331090002 HH PPS REVENUE DEBIT

## 2019-03-23 PROCEDURE — 3331090001 HH PPS REVENUE CREDIT

## 2019-03-23 PROCEDURE — 3331090002 HH PPS REVENUE DEBIT

## 2019-03-24 PROCEDURE — 3331090001 HH PPS REVENUE CREDIT

## 2019-03-24 PROCEDURE — 3331090002 HH PPS REVENUE DEBIT

## 2019-03-25 ENCOUNTER — HOME CARE VISIT (OUTPATIENT)
Dept: SCHEDULING | Facility: HOME HEALTH | Age: 64
End: 2019-03-25
Payer: MEDICARE

## 2019-03-25 VITALS
RESPIRATION RATE: 16 BRPM | HEART RATE: 92 BPM | TEMPERATURE: 97.5 F | SYSTOLIC BLOOD PRESSURE: 138 MMHG | DIASTOLIC BLOOD PRESSURE: 70 MMHG

## 2019-03-25 PROCEDURE — 3331090002 HH PPS REVENUE DEBIT

## 2019-03-25 PROCEDURE — 3331090001 HH PPS REVENUE CREDIT

## 2019-03-25 PROCEDURE — G0157 HHC PT ASSISTANT EA 15: HCPCS

## 2019-03-26 PROCEDURE — 3331090002 HH PPS REVENUE DEBIT

## 2019-03-26 PROCEDURE — A4649 SURGICAL SUPPLIES: HCPCS

## 2019-03-26 PROCEDURE — 3331090001 HH PPS REVENUE CREDIT

## 2019-03-27 ENCOUNTER — HOME CARE VISIT (OUTPATIENT)
Dept: SCHEDULING | Facility: HOME HEALTH | Age: 64
End: 2019-03-27
Payer: MEDICARE

## 2019-03-27 VITALS
HEART RATE: 97 BPM | DIASTOLIC BLOOD PRESSURE: 70 MMHG | TEMPERATURE: 97.7 F | RESPIRATION RATE: 16 BRPM | SYSTOLIC BLOOD PRESSURE: 138 MMHG

## 2019-03-27 PROCEDURE — 3331090002 HH PPS REVENUE DEBIT

## 2019-03-27 PROCEDURE — G0157 HHC PT ASSISTANT EA 15: HCPCS

## 2019-03-27 PROCEDURE — 3331090001 HH PPS REVENUE CREDIT

## 2019-03-28 PROCEDURE — 3331090002 HH PPS REVENUE DEBIT

## 2019-03-28 PROCEDURE — 3331090001 HH PPS REVENUE CREDIT

## 2019-03-29 PROCEDURE — 3331090001 HH PPS REVENUE CREDIT

## 2019-03-29 PROCEDURE — 3331090002 HH PPS REVENUE DEBIT

## 2019-03-30 PROCEDURE — 3331090001 HH PPS REVENUE CREDIT

## 2019-03-30 PROCEDURE — 3331090002 HH PPS REVENUE DEBIT

## 2019-03-31 PROCEDURE — 3331090001 HH PPS REVENUE CREDIT

## 2019-03-31 PROCEDURE — 3331090002 HH PPS REVENUE DEBIT

## 2019-04-01 ENCOUNTER — HOME CARE VISIT (OUTPATIENT)
Dept: SCHEDULING | Facility: HOME HEALTH | Age: 64
End: 2019-04-01
Payer: MEDICARE

## 2019-04-01 VITALS
TEMPERATURE: 97.9 F | RESPIRATION RATE: 18 BRPM | DIASTOLIC BLOOD PRESSURE: 78 MMHG | HEART RATE: 96 BPM | SYSTOLIC BLOOD PRESSURE: 130 MMHG

## 2019-04-01 PROCEDURE — 3331090002 HH PPS REVENUE DEBIT

## 2019-04-01 PROCEDURE — G0151 HHCP-SERV OF PT,EA 15 MIN: HCPCS

## 2019-04-01 PROCEDURE — 3331090001 HH PPS REVENUE CREDIT

## 2019-04-02 PROCEDURE — 3331090002 HH PPS REVENUE DEBIT

## 2019-04-02 PROCEDURE — 3331090001 HH PPS REVENUE CREDIT

## 2019-04-03 PROCEDURE — 3331090001 HH PPS REVENUE CREDIT

## 2019-04-03 PROCEDURE — 3331090002 HH PPS REVENUE DEBIT

## 2019-04-04 ENCOUNTER — HOME CARE VISIT (OUTPATIENT)
Dept: SCHEDULING | Facility: HOME HEALTH | Age: 64
End: 2019-04-04
Payer: MEDICARE

## 2019-04-04 VITALS
TEMPERATURE: 98.4 F | SYSTOLIC BLOOD PRESSURE: 118 MMHG | DIASTOLIC BLOOD PRESSURE: 68 MMHG | HEART RATE: 84 BPM | RESPIRATION RATE: 16 BRPM

## 2019-04-04 PROCEDURE — 3331090001 HH PPS REVENUE CREDIT

## 2019-04-04 PROCEDURE — 3331090002 HH PPS REVENUE DEBIT

## 2019-04-04 PROCEDURE — G0151 HHCP-SERV OF PT,EA 15 MIN: HCPCS

## (undated) DEVICE — CURETTE BNE CEM 10IN DISP --

## (undated) DEVICE — DRAPE,TOP,102X53,STERILE: Brand: MEDLINE

## (undated) DEVICE — SUTURE VCRL SZ 1 L27IN ABSRB UD L36MM CP-1 1/2 CIR REV CUT J268H

## (undated) DEVICE — TRAY PREP DRY W/ PREM GLV 2 APPL 6 SPNG 2 UNDPD 1 OVERWRAP

## (undated) DEVICE — SOLUTION IRRIG 3000ML 0.9% SOD CHL FLX CONT 0797208] ICU MEDICAL INC]

## (undated) DEVICE — NEEDLE HYPO 18GA L1.5IN PNK S STL HUB POLYPR SHLD REG BVL

## (undated) DEVICE — REM POLYHESIVE ADULT PATIENT RETURN ELECTRODE: Brand: VALLEYLAB

## (undated) DEVICE — T4 HOOD

## (undated) DEVICE — HANDPIECE SET WITH COAXIAL HIGH FLOW TIP AND SUCTION TUBE: Brand: INTERPULSE

## (undated) DEVICE — PACK PROCEDURE SURG TOT KNEE

## (undated) DEVICE — BLADE SAW PAT RMR PILT H 46MM --

## (undated) DEVICE — SOLUTION IV 500ML 0.9% SOD CHL FLX CONT

## (undated) DEVICE — SYR LR LCK 1ML GRAD NSAF 30ML --

## (undated) DEVICE — X-RAY SPONGES,12 PLY: Brand: DERMACEA

## (undated) DEVICE — DUAL CUT SAGITTAL BLADE

## (undated) DEVICE — Z DISCONTINUED USE 2744636  DRESSING AQUACEL 14 IN ALG W3.5XL14IN POLYUR FLM CVR W/ HYDRCOLL

## (undated) DEVICE — BLADE SAW W12.5XL73.5MM THK0.8MM CUT THK1MM RECIP FOR L BNE

## (undated) DEVICE — MEDI-VAC YANKAUER SUCTION HANDLE W/BULBOUS TIP: Brand: CARDINAL HEALTH

## (undated) DEVICE — 2000CC GUARDIAN II: Brand: GUARDIAN

## (undated) DEVICE — SYR 50ML LR LCK 1ML GRAD NSAF --

## (undated) DEVICE — SUTURE VCRL SZ 2-0 L27IN ABSRB UD L36MM CP-1 1/2 CIR REV J266H

## (undated) DEVICE — 3M™ IOBAN™ 2 ANTIMICROBIAL INCISE DRAPE 6650EZ: Brand: IOBAN™ 2

## (undated) DEVICE — MCLASS OSCILLATING SAW BLADE 19 X 1.27 (0.050") X 90 MM: Brand: MCLASS

## (undated) DEVICE — FAN SPRAY KIT: Brand: PULSAVAC®

## (undated) DEVICE — (D)PREP SKN CHLRAPRP APPL 26ML -- CONVERT TO ITEM 371833

## (undated) DEVICE — MEDI-VAC NON-CONDUCTIVE SUCTION TUBING: Brand: CARDINAL HEALTH

## (undated) DEVICE — SOLUTION IV 1000ML 0.9% SOD CHL

## (undated) DEVICE — PACK TKR SZ 8 FEM PREP TRL POST STBL INTUITION SOLO ATTUNE

## (undated) DEVICE — DRAPE SHT 3 QTR PROXIMA 53X77 --

## (undated) DEVICE — BUTTON SWITCH PENCIL BLADE ELECTRODE, HOLSTER: Brand: EDGE

## (undated) DEVICE — BLADE SAW W12.5XL70MM THK0.8MM CUT THK1.12MM S STL RECIP

## (undated) DEVICE — GOWN,REINF,POLY,ECL,PP SLV,XL: Brand: MEDLINE

## (undated) DEVICE — 3000CC GUARDIAN II: Brand: GUARDIAN

## (undated) DEVICE — 3M™ COBAN™ NL STERILE NON-LATEX SELF-ADHERENT WRAP, 2084S, 4 IN X 5 YD (10 CM X 4,5 M), 18 ROLLS/CASE: Brand: 3M™ COBAN™

## (undated) DEVICE — TRAY CATH 16F DRN BG LTX -- CONVERT TO ITEM 363158

## (undated) DEVICE — DRAPE,U/SHT,SPLIT,FILM,60X84,STERILE: Brand: MEDLINE

## (undated) DEVICE — BIPOLAR SEALER 23-112-1 AQM 6.0: Brand: AQUAMANTYS ®

## (undated) DEVICE — GAUZE,SPONGE,8"X4",12PLY,XRAY,STRL,LF: Brand: MEDLINE

## (undated) DEVICE — SLIM BODY SKIN STAPLER: Brand: APPOSE ULC

## (undated) DEVICE — SKIN STAPLER: Brand: SIGNET

## (undated) DEVICE — Device: Brand: POWER-FLO®

## (undated) DEVICE — BLADE SAW PAT RMR PILT H 51MM --

## (undated) DEVICE — SUTURE STRATAFIX SPRL SZ 1 L14IN ABSRB VLT L48CM CTX 1/2 SXPD2B405

## (undated) DEVICE — SUTURE STRATAFIX SPRL SZ 1 L5IN ABSRB VLT CT-1 L36MM 1/2 SXPD2B401

## (undated) DEVICE — BANDAGE COMPR SELF ADH 5 YDX4 IN TAN STRL PREMIERPRO LF